# Patient Record
Sex: MALE | Race: BLACK OR AFRICAN AMERICAN | NOT HISPANIC OR LATINO | Employment: FULL TIME | URBAN - METROPOLITAN AREA
[De-identification: names, ages, dates, MRNs, and addresses within clinical notes are randomized per-mention and may not be internally consistent; named-entity substitution may affect disease eponyms.]

---

## 2023-05-04 ENCOUNTER — LAB (OUTPATIENT)
Dept: LAB | Facility: LAB | Age: 53
End: 2023-05-04
Payer: COMMERCIAL

## 2023-05-04 ENCOUNTER — OFFICE VISIT (OUTPATIENT)
Dept: PRIMARY CARE | Facility: CLINIC | Age: 53
End: 2023-05-04
Payer: COMMERCIAL

## 2023-05-04 VITALS
BODY MASS INDEX: 33.8 KG/M2 | OXYGEN SATURATION: 98 % | WEIGHT: 223 LBS | HEIGHT: 68 IN | DIASTOLIC BLOOD PRESSURE: 111 MMHG | SYSTOLIC BLOOD PRESSURE: 163 MMHG | HEART RATE: 100 BPM | TEMPERATURE: 97.8 F

## 2023-05-04 DIAGNOSIS — E11.9 TYPE 2 DIABETES MELLITUS WITHOUT COMPLICATION, WITHOUT LONG-TERM CURRENT USE OF INSULIN (MULTI): ICD-10-CM

## 2023-05-04 DIAGNOSIS — M1A.0790 CHRONIC IDIOPATHIC GOUT INVOLVING TOE WITHOUT TOPHUS, UNSPECIFIED LATERALITY: ICD-10-CM

## 2023-05-04 DIAGNOSIS — R29.898 LEG WEAKNESS, BILATERAL: ICD-10-CM

## 2023-05-04 DIAGNOSIS — I10 PRIMARY HYPERTENSION: ICD-10-CM

## 2023-05-04 DIAGNOSIS — E11.9 TYPE 2 DIABETES MELLITUS WITHOUT COMPLICATION, WITHOUT LONG-TERM CURRENT USE OF INSULIN (MULTI): Primary | ICD-10-CM

## 2023-05-04 DIAGNOSIS — Z00.00 HEALTHCARE MAINTENANCE: ICD-10-CM

## 2023-05-04 LAB
ALANINE AMINOTRANSFERASE (SGPT) (U/L) IN SER/PLAS: 14 U/L (ref 10–52)
ALBUMIN (G/DL) IN SER/PLAS: 4.3 G/DL (ref 3.4–5)
ALKALINE PHOSPHATASE (U/L) IN SER/PLAS: 60 U/L (ref 33–120)
ANION GAP IN SER/PLAS: 14 MMOL/L (ref 10–20)
ASPARTATE AMINOTRANSFERASE (SGOT) (U/L) IN SER/PLAS: 15 U/L (ref 9–39)
BASOPHILS (10*3/UL) IN BLOOD BY AUTOMATED COUNT: 0.07 X10E9/L (ref 0–0.1)
BASOPHILS/100 LEUKOCYTES IN BLOOD BY AUTOMATED COUNT: 1 % (ref 0–2)
BILIRUBIN TOTAL (MG/DL) IN SER/PLAS: 0.5 MG/DL (ref 0–1.2)
CALCIUM (MG/DL) IN SER/PLAS: 9.9 MG/DL (ref 8.6–10.6)
CARBON DIOXIDE, TOTAL (MMOL/L) IN SER/PLAS: 26 MMOL/L (ref 21–32)
CHLORIDE (MMOL/L) IN SER/PLAS: 101 MMOL/L (ref 98–107)
CHOLESTEROL (MG/DL) IN SER/PLAS: 199 MG/DL (ref 0–199)
CHOLESTEROL IN HDL (MG/DL) IN SER/PLAS: 39.9 MG/DL
CHOLESTEROL/HDL RATIO: 5
CREATININE (MG/DL) IN SER/PLAS: 1.26 MG/DL (ref 0.5–1.3)
EOSINOPHILS (10*3/UL) IN BLOOD BY AUTOMATED COUNT: 0.19 X10E9/L (ref 0–0.7)
EOSINOPHILS/100 LEUKOCYTES IN BLOOD BY AUTOMATED COUNT: 2.8 % (ref 0–6)
ERYTHROCYTE DISTRIBUTION WIDTH (RATIO) BY AUTOMATED COUNT: 12.4 % (ref 11.5–14.5)
ERYTHROCYTE MEAN CORPUSCULAR HEMOGLOBIN CONCENTRATION (G/DL) BY AUTOMATED: 33.9 G/DL (ref 32–36)
ERYTHROCYTE MEAN CORPUSCULAR VOLUME (FL) BY AUTOMATED COUNT: 85 FL (ref 80–100)
ERYTHROCYTES (10*6/UL) IN BLOOD BY AUTOMATED COUNT: 5.09 X10E12/L (ref 4.5–5.9)
ESTIMATED AVERAGE GLUCOSE FOR HBA1C: 324 MG/DL
GFR MALE: 67 ML/MIN/1.73M2
GLUCOSE (MG/DL) IN SER/PLAS: 294 MG/DL (ref 74–99)
HEMATOCRIT (%) IN BLOOD BY AUTOMATED COUNT: 43.3 % (ref 41–52)
HEMOGLOBIN (G/DL) IN BLOOD: 14.7 G/DL (ref 13.5–17.5)
HEMOGLOBIN A1C/HEMOGLOBIN TOTAL IN BLOOD: 12.9 %
HEPATITIS C VIRUS AB PRESENCE IN SERUM: NONREACTIVE
HIV 1/ 2 AG/AB SCREEN: NONREACTIVE
IMMATURE GRANULOCYTES/100 LEUKOCYTES IN BLOOD BY AUTOMATED COUNT: 0.3 % (ref 0–0.9)
LDL: 121 MG/DL (ref 0–99)
LEUKOCYTES (10*3/UL) IN BLOOD BY AUTOMATED COUNT: 6.7 X10E9/L (ref 4.4–11.3)
LYMPHOCYTES (10*3/UL) IN BLOOD BY AUTOMATED COUNT: 1.37 X10E9/L (ref 1.2–4.8)
LYMPHOCYTES/100 LEUKOCYTES IN BLOOD BY AUTOMATED COUNT: 20.4 % (ref 13–44)
MAGNESIUM (MG/DL) IN SER/PLAS: 2.21 MG/DL (ref 1.6–2.4)
MONOCYTES (10*3/UL) IN BLOOD BY AUTOMATED COUNT: 0.73 X10E9/L (ref 0.1–1)
MONOCYTES/100 LEUKOCYTES IN BLOOD BY AUTOMATED COUNT: 10.9 % (ref 2–10)
NEUTROPHILS (10*3/UL) IN BLOOD BY AUTOMATED COUNT: 4.33 X10E9/L (ref 1.2–7.7)
NEUTROPHILS/100 LEUKOCYTES IN BLOOD BY AUTOMATED COUNT: 64.6 % (ref 40–80)
NRBC (PER 100 WBCS) BY AUTOMATED COUNT: 0 /100 WBC (ref 0–0)
PLATELETS (10*3/UL) IN BLOOD AUTOMATED COUNT: 242 X10E9/L (ref 150–450)
POTASSIUM (MMOL/L) IN SER/PLAS: 5.1 MMOL/L (ref 3.5–5.3)
PROSTATE SPECIFIC ANTIGEN,SCREEN: 0.73 NG/ML (ref 0–4)
PROTEIN TOTAL: 7.4 G/DL (ref 6.4–8.2)
SODIUM (MMOL/L) IN SER/PLAS: 136 MMOL/L (ref 136–145)
TRIGLYCERIDE (MG/DL) IN SER/PLAS: 189 MG/DL (ref 0–149)
URATE (MG/DL) IN SER/PLAS: 6.5 MG/DL (ref 4–7.5)
UREA NITROGEN (MG/DL) IN SER/PLAS: 23 MG/DL (ref 6–23)
VLDL: 38 MG/DL (ref 0–40)

## 2023-05-04 PROCEDURE — 80053 COMPREHEN METABOLIC PANEL: CPT

## 2023-05-04 PROCEDURE — 1036F TOBACCO NON-USER: CPT | Performed by: STUDENT IN AN ORGANIZED HEALTH CARE EDUCATION/TRAINING PROGRAM

## 2023-05-04 PROCEDURE — 84153 ASSAY OF PSA TOTAL: CPT

## 2023-05-04 PROCEDURE — 87389 HIV-1 AG W/HIV-1&-2 AB AG IA: CPT

## 2023-05-04 PROCEDURE — 99205 OFFICE O/P NEW HI 60 MIN: CPT | Performed by: STUDENT IN AN ORGANIZED HEALTH CARE EDUCATION/TRAINING PROGRAM

## 2023-05-04 PROCEDURE — 83036 HEMOGLOBIN GLYCOSYLATED A1C: CPT

## 2023-05-04 PROCEDURE — 83735 ASSAY OF MAGNESIUM: CPT

## 2023-05-04 PROCEDURE — 80061 LIPID PANEL: CPT

## 2023-05-04 PROCEDURE — 3080F DIAST BP >= 90 MM HG: CPT | Performed by: STUDENT IN AN ORGANIZED HEALTH CARE EDUCATION/TRAINING PROGRAM

## 2023-05-04 PROCEDURE — 84550 ASSAY OF BLOOD/URIC ACID: CPT

## 2023-05-04 PROCEDURE — 3077F SYST BP >= 140 MM HG: CPT | Performed by: STUDENT IN AN ORGANIZED HEALTH CARE EDUCATION/TRAINING PROGRAM

## 2023-05-04 PROCEDURE — 86803 HEPATITIS C AB TEST: CPT

## 2023-05-04 PROCEDURE — 85025 COMPLETE CBC W/AUTO DIFF WBC: CPT

## 2023-05-04 PROCEDURE — 36415 COLL VENOUS BLD VENIPUNCTURE: CPT

## 2023-05-04 RX ORDER — ACYCLOVIR 400 MG/1
1 TABLET ORAL 2 TIMES DAILY
COMMUNITY
Start: 2016-06-15

## 2023-05-04 RX ORDER — SULFAMETHOXAZOLE AND TRIMETHOPRIM 800; 160 MG/1; MG/1
1 TABLET ORAL EVERY 12 HOURS
COMMUNITY
Start: 2023-04-26 | End: 2023-10-10 | Stop reason: WASHOUT

## 2023-05-04 RX ORDER — AMLODIPINE BESYLATE 5 MG/1
5 TABLET ORAL DAILY
COMMUNITY
End: 2023-05-11 | Stop reason: SDUPTHER

## 2023-05-04 RX ORDER — CEPHALEXIN 500 MG/1
500 CAPSULE ORAL 4 TIMES DAILY
COMMUNITY
Start: 2023-04-26 | End: 2023-10-10 | Stop reason: WASHOUT

## 2023-05-04 ASSESSMENT — PAIN SCALES - GENERAL: PAINLEVEL: 0-NO PAIN

## 2023-05-04 NOTE — PROGRESS NOTES
"Subjective   Patient ID: Yrn Rock is a 52 y.o. male who presents for Establish Care.      1. HTN, uncontrolled  /105 in ED  163/111 in office today  Given amlo 5 mg, now up to 10 mg  Also hx of DM2, not on medications  Using BP cuff at home, cannot recall the readings    2. Weakness of legs  Feels more proximal  Is only when going up flights of stairs, not while ambulating on flat surface  Works remotely for [employer]  No traumas or falls  No FH of muscular disease, only dementia in mother    3. Gout  Toes and ankles affected previously  Was after being put on diuretics for HTN    4. RHM  Elects PSA screening -  and does not know father's family well  Colonoscopy counseled  HCV, HIV lifetime screen    Ingrown toenail - going to Podiatrist  Still taking antibiotics - bactrim    PMH  Gout - toe and ankle - asking to avoid diuretics    FH  Dementia in mother - passed Jan 2021  Father's family - DM         Review of Systems   All other systems reviewed and are negative.      Objective   BP (!) 163/11 (BP Location: Left arm, Patient Position: Sitting)   Pulse 100   Temp 36.6 °C (97.8 °F) (Temporal)   Ht 1.727 m (5' 8\")   Wt 101 kg (223 lb)   SpO2 98%   BMI 33.91 kg/m²     Physical Exam  Vitals and nursing note reviewed.   Constitutional:       Appearance: Normal appearance.   HENT:      Head: Normocephalic and atraumatic.      Right Ear: Tympanic membrane normal.      Left Ear: Tympanic membrane normal.   Eyes:      Extraocular Movements: Extraocular movements intact.      Pupils: Pupils are equal, round, and reactive to light.   Cardiovascular:      Rate and Rhythm: Normal rate and regular rhythm.      Pulses: Normal pulses.      Heart sounds: Normal heart sounds.   Pulmonary:      Effort: Pulmonary effort is normal.      Breath sounds: Normal breath sounds.   Abdominal:      General: Abdomen is flat.      Palpations: Abdomen is soft.   Musculoskeletal:         General: Normal range of motion. "      Cervical back: Normal range of motion and neck supple.   Skin:     General: Skin is warm and dry.   Neurological:      General: No focal deficit present.      Mental Status: He is alert and oriented to person, place, and time.      Cranial Nerves: No cranial nerve deficit.      Motor: No weakness.      Gait: Gait normal.      Deep Tendon Reflexes: Reflexes normal.   Psychiatric:         Mood and Affect: Mood normal.         Behavior: Behavior normal.         Assessment/Plan   Problem List Items Addressed This Visit    None  Visit Diagnoses       Type 2 diabetes mellitus without complication, without long-term current use of insulin (CMS/[facility])    -  Primary    Relevant Orders    Hemoglobin A1C    Comprehensive Metabolic Panel    Lipid panel    Primary hypertension        Relevant Orders    Comprehensive Metabolic Panel    Lipid panel    Chronic idiopathic gout involving toe without tophus, unspecified laterality        Relevant Orders    Uric Acid    Healthcare maintenance        Relevant Orders    Prostate Spec.Ag,Screen    CBC and Auto Differential    Hepatitis C antibody    HIV 1/2 Antigen/Antibody Screen with Reflex to Confirmation    Colonoscopy    Leg weakness, bilateral        Relevant Orders    Magnesium             [ ] urid acid level for gout hx  [ ] CMP, CBC with diff, Mg for weakness, HTN, DM2  [ ] A1c for DM2, uncontroleld  [ ] HCV, HIV, lipid screenings  [ ] patient elects PSA screening after discussion of risks/benefits  [ ] colonoscopy screening

## 2023-05-05 DIAGNOSIS — E78.5 HYPERLIPIDEMIA, UNSPECIFIED HYPERLIPIDEMIA TYPE: ICD-10-CM

## 2023-05-05 DIAGNOSIS — E11.9 TYPE 2 DIABETES MELLITUS WITHOUT COMPLICATION, WITHOUT LONG-TERM CURRENT USE OF INSULIN (MULTI): Primary | ICD-10-CM

## 2023-05-05 DIAGNOSIS — I10 PRIMARY HYPERTENSION: ICD-10-CM

## 2023-05-05 NOTE — PROGRESS NOTES
A1c 12.9%  ASCVD 17.3-18.8%    Starting Metformin 1000 mg BID  Starting Atorvastatin 80 mg at bedtime    [facility].

## 2023-05-11 RX ORDER — METFORMIN HYDROCHLORIDE 500 MG/1
1000 TABLET ORAL
Qty: 120 TABLET | Refills: 11 | Status: SHIPPED | OUTPATIENT
Start: 2023-05-11 | End: 2023-10-10 | Stop reason: WASHOUT

## 2023-05-11 RX ORDER — AMLODIPINE BESYLATE 5 MG/1
5 TABLET ORAL DAILY
Qty: 90 TABLET | Refills: 3 | Status: SHIPPED | OUTPATIENT
Start: 2023-05-11 | End: 2023-10-10 | Stop reason: SDUPTHER

## 2023-05-11 RX ORDER — ATORVASTATIN CALCIUM 80 MG/1
80 TABLET, FILM COATED ORAL NIGHTLY
Qty: 90 TABLET | Refills: 3 | Status: SHIPPED | OUTPATIENT
Start: 2023-05-11 | End: 2023-10-10 | Stop reason: SDUPTHER

## 2023-09-06 PROBLEM — M79.672 FOOT PAIN, BILATERAL: Status: ACTIVE | Noted: 2023-09-06

## 2023-09-06 PROBLEM — H02.889 MGD (MEIBOMIAN GLAND DISEASE): Status: ACTIVE | Noted: 2023-09-06

## 2023-09-06 PROBLEM — E78.5 HYPERLIPIDEMIA: Status: ACTIVE | Noted: 2023-09-06

## 2023-09-06 PROBLEM — B00.9 HERPES: Status: ACTIVE | Noted: 2023-09-06

## 2023-09-06 PROBLEM — H25.813 COMBINED FORMS OF AGE-RELATED CATARACT OF BOTH EYES: Status: ACTIVE | Noted: 2023-09-06

## 2023-09-06 PROBLEM — E11.9 DIABETES MELLITUS TYPE 2 WITHOUT RETINOPATHY (MULTI): Status: ACTIVE | Noted: 2023-09-06

## 2023-09-06 PROBLEM — H52.4 MYOPIA WITH ASTIGMATISM AND PRESBYOPIA: Status: ACTIVE | Noted: 2023-09-06

## 2023-09-06 PROBLEM — E11.21 DIABETIC NEPHROPATHY (MULTI): Status: ACTIVE | Noted: 2023-09-06

## 2023-09-06 PROBLEM — E66.9 OBESITY: Status: ACTIVE | Noted: 2023-09-06

## 2023-09-06 PROBLEM — E11.3293 MILD NONPROLIFERATIVE DIABETIC RETINOPATHY OF BOTH EYES ASSOCIATED WITH TYPE 2 DIABETES MELLITUS (MULTI): Status: ACTIVE | Noted: 2023-09-06

## 2023-09-06 PROBLEM — R00.0 TACHYCARDIA: Status: ACTIVE | Noted: 2023-09-06

## 2023-09-06 PROBLEM — H52.209 MYOPIA WITH ASTIGMATISM AND PRESBYOPIA: Status: ACTIVE | Noted: 2023-09-06

## 2023-09-06 PROBLEM — H52.10 MYOPIA WITH ASTIGMATISM AND PRESBYOPIA: Status: ACTIVE | Noted: 2023-09-06

## 2023-09-06 PROBLEM — M79.671 FOOT PAIN, BILATERAL: Status: ACTIVE | Noted: 2023-09-06

## 2023-09-06 PROBLEM — L60.0 ONYCHOCRYPTOSIS: Status: ACTIVE | Noted: 2023-09-06

## 2023-09-06 PROBLEM — K42.9 UMBILICAL HERNIA: Status: ACTIVE | Noted: 2023-09-06

## 2023-09-06 PROBLEM — E55.9 VITAMIN D DEFICIENCY: Status: ACTIVE | Noted: 2023-09-06

## 2023-09-06 PROBLEM — I10 HYPERTENSION: Status: ACTIVE | Noted: 2023-09-06

## 2023-09-06 RX ORDER — ATORVASTATIN CALCIUM 40 MG/1
40 TABLET, FILM COATED ORAL DAILY
COMMUNITY
End: 2023-10-10 | Stop reason: WASHOUT

## 2023-09-06 RX ORDER — LOSARTAN POTASSIUM 100 MG/1
100 TABLET ORAL DAILY
COMMUNITY
End: 2023-10-10 | Stop reason: WASHOUT

## 2023-09-06 RX ORDER — METFORMIN HYDROCHLORIDE 1000 MG/1
1000 TABLET ORAL 2 TIMES DAILY
COMMUNITY
End: 2023-10-10 | Stop reason: WASHOUT

## 2023-09-06 RX ORDER — GLIPIZIDE 5 MG/1
5 TABLET ORAL 2 TIMES DAILY
COMMUNITY
End: 2023-10-10 | Stop reason: WASHOUT

## 2023-09-06 RX ORDER — ASPIRIN 81 MG/1
81 TABLET ORAL DAILY
COMMUNITY
End: 2023-10-10 | Stop reason: WASHOUT

## 2023-10-10 ENCOUNTER — OFFICE VISIT (OUTPATIENT)
Dept: PRIMARY CARE | Facility: HOSPITAL | Age: 53
End: 2023-10-10
Payer: COMMERCIAL

## 2023-10-10 VITALS
HEART RATE: 120 BPM | BODY MASS INDEX: 34.39 KG/M2 | TEMPERATURE: 97 F | WEIGHT: 226.9 LBS | SYSTOLIC BLOOD PRESSURE: 148 MMHG | DIASTOLIC BLOOD PRESSURE: 101 MMHG | OXYGEN SATURATION: 98 % | HEIGHT: 68 IN

## 2023-10-10 DIAGNOSIS — R00.0 TACHYCARDIA: ICD-10-CM

## 2023-10-10 DIAGNOSIS — E11.42 TYPE 2 DIABETES MELLITUS WITH DIABETIC POLYNEUROPATHY, WITHOUT LONG-TERM CURRENT USE OF INSULIN (MULTI): Primary | ICD-10-CM

## 2023-10-10 DIAGNOSIS — E78.5 HYPERLIPIDEMIA, UNSPECIFIED HYPERLIPIDEMIA TYPE: ICD-10-CM

## 2023-10-10 DIAGNOSIS — I10 PRIMARY HYPERTENSION: ICD-10-CM

## 2023-10-10 LAB
ALBUMIN SERPL BCP-MCNC: 4.1 G/DL (ref 3.4–5)
ALP SERPL-CCNC: 57 U/L (ref 33–120)
ALT SERPL W P-5'-P-CCNC: 18 U/L (ref 10–52)
ANION GAP SERPL CALC-SCNC: 17 MMOL/L (ref 10–20)
AST SERPL W P-5'-P-CCNC: 20 U/L (ref 9–39)
BILIRUB SERPL-MCNC: 0.8 MG/DL (ref 0–1.2)
BUN SERPL-MCNC: 19 MG/DL (ref 6–23)
CALCIUM SERPL-MCNC: 9.5 MG/DL (ref 8.6–10.6)
CHLORIDE SERPL-SCNC: 102 MMOL/L (ref 98–107)
CHOLEST SERPL-MCNC: 101 MG/DL (ref 0–199)
CHOLESTEROL/HDL RATIO: 2.7
CO2 SERPL-SCNC: 24 MMOL/L (ref 21–32)
CREAT SERPL-MCNC: 1.02 MG/DL (ref 0.5–1.3)
GFR SERPL CREATININE-BSD FRML MDRD: 87 ML/MIN/1.73M*2
GLUCOSE BLD MANUAL STRIP-MCNC: 162 MG/DL (ref 74–99)
GLUCOSE SERPL-MCNC: 142 MG/DL (ref 74–99)
HDLC SERPL-MCNC: 37.4 MG/DL
LDLC SERPL CALC-MCNC: 47 MG/DL (ref 140–190)
NON HDL CHOLESTEROL: 64 MG/DL (ref 0–149)
POTASSIUM SERPL-SCNC: 4.2 MMOL/L (ref 3.5–5.3)
PROT SERPL-MCNC: 7 G/DL (ref 6.4–8.2)
SODIUM SERPL-SCNC: 139 MMOL/L (ref 136–145)
TRIGL SERPL-MCNC: 85 MG/DL (ref 0–149)
TSH SERPL-ACNC: 1.89 MIU/L (ref 0.44–3.98)
VLDL: 17 MG/DL (ref 0–40)

## 2023-10-10 PROCEDURE — 36415 COLL VENOUS BLD VENIPUNCTURE: CPT

## 2023-10-10 PROCEDURE — 93010 ELECTROCARDIOGRAM REPORT: CPT

## 2023-10-10 PROCEDURE — 80053 COMPREHEN METABOLIC PANEL: CPT

## 2023-10-10 PROCEDURE — 82043 UR ALBUMIN QUANTITATIVE: CPT

## 2023-10-10 PROCEDURE — 90686 IIV4 VACC NO PRSV 0.5 ML IM: CPT | Mod: GC

## 2023-10-10 PROCEDURE — 3046F HEMOGLOBIN A1C LEVEL >9.0%: CPT

## 2023-10-10 PROCEDURE — 90471 IMMUNIZATION ADMIN: CPT | Mod: GC

## 2023-10-10 PROCEDURE — 93010 ELECTROCARDIOGRAM REPORT: CPT | Performed by: INTERNAL MEDICINE

## 2023-10-10 PROCEDURE — 84443 ASSAY THYROID STIM HORMONE: CPT

## 2023-10-10 PROCEDURE — 3048F LDL-C <100 MG/DL: CPT

## 2023-10-10 PROCEDURE — 3077F SYST BP >= 140 MM HG: CPT

## 2023-10-10 PROCEDURE — 3062F POS MACROALBUMINURIA REV: CPT

## 2023-10-10 PROCEDURE — 3080F DIAST BP >= 90 MM HG: CPT

## 2023-10-10 PROCEDURE — 36416 COLLJ CAPILLARY BLOOD SPEC: CPT

## 2023-10-10 PROCEDURE — 99213 OFFICE O/P EST LOW 20 MIN: CPT | Mod: GC,25

## 2023-10-10 PROCEDURE — 1036F TOBACCO NON-USER: CPT

## 2023-10-10 PROCEDURE — 93005 ELECTROCARDIOGRAM TRACING: CPT

## 2023-10-10 PROCEDURE — 82947 ASSAY GLUCOSE BLOOD QUANT: CPT

## 2023-10-10 PROCEDURE — 99213 OFFICE O/P EST LOW 20 MIN: CPT

## 2023-10-10 PROCEDURE — 80061 LIPID PANEL: CPT

## 2023-10-10 PROCEDURE — 36416 COLLJ CAPILLARY BLOOD SPEC: CPT | Mod: GC

## 2023-10-10 PROCEDURE — 83036 HEMOGLOBIN GLYCOSYLATED A1C: CPT

## 2023-10-10 RX ORDER — METFORMIN HYDROCHLORIDE 500 MG/1
1000 TABLET, EXTENDED RELEASE ORAL
Qty: 60 TABLET | Refills: 5 | Status: SHIPPED | OUTPATIENT
Start: 2023-10-10 | End: 2024-02-22 | Stop reason: SDUPTHER

## 2023-10-10 RX ORDER — ATORVASTATIN CALCIUM 80 MG/1
80 TABLET, FILM COATED ORAL NIGHTLY
Qty: 90 TABLET | Refills: 1 | Status: SHIPPED | OUTPATIENT
Start: 2023-10-10 | End: 2024-04-18

## 2023-10-10 RX ORDER — AMLODIPINE BESYLATE 5 MG/1
10 TABLET ORAL DAILY
Qty: 90 TABLET | Refills: 3 | Status: SHIPPED | OUTPATIENT
Start: 2023-10-10 | End: 2024-01-12 | Stop reason: SINTOL

## 2023-10-10 ASSESSMENT — COLUMBIA-SUICIDE SEVERITY RATING SCALE - C-SSRS
1. IN THE PAST MONTH, HAVE YOU WISHED YOU WERE DEAD OR WISHED YOU COULD GO TO SLEEP AND NOT WAKE UP?: NO
2. HAVE YOU ACTUALLY HAD ANY THOUGHTS OF KILLING YOURSELF?: NO
6. HAVE YOU EVER DONE ANYTHING, STARTED TO DO ANYTHING, OR PREPARED TO DO ANYTHING TO END YOUR LIFE?: NO

## 2023-10-10 ASSESSMENT — PATIENT HEALTH QUESTIONNAIRE - PHQ9
1. LITTLE INTEREST OR PLEASURE IN DOING THINGS: NOT AT ALL
2. FEELING DOWN, DEPRESSED OR HOPELESS: NOT AT ALL
SUM OF ALL RESPONSES TO PHQ9 QUESTIONS 1 AND 2: 0

## 2023-10-10 ASSESSMENT — LIFESTYLE VARIABLES
HOW OFTEN DO YOU HAVE SIX OR MORE DRINKS ON ONE OCCASION: NEVER
HOW OFTEN DO YOU HAVE A DRINK CONTAINING ALCOHOL: NEVER
AUDIT-C TOTAL SCORE: 0
SKIP TO QUESTIONS 9-10: 1
HOW MANY STANDARD DRINKS CONTAINING ALCOHOL DO YOU HAVE ON A TYPICAL DAY: PATIENT DOES NOT DRINK

## 2023-10-10 ASSESSMENT — PAIN SCALES - GENERAL: PAINLEVEL: 0-NO PAIN

## 2023-10-10 ASSESSMENT — ENCOUNTER SYMPTOMS
LOSS OF SENSATION IN FEET: 0
OCCASIONAL FEELINGS OF UNSTEADINESS: 0
DEPRESSION: 0

## 2023-10-10 NOTE — PATIENT INSTRUCTIONS
Hi Mr Rock,     Thank you for visiting us today. It was a pleasure seeing you. Here is a summary of what we discussed:     Diabetes - we will try changing your Metformin to extended release and start you on a new medication (Jardiance)  2.   High Blood pressure - increasing amlodipine to 10mg per day  3.   Flu shot  4.   Blood work - we will call with results or you can see the results through Patient Portal    Please return to clinic 3 months  I will next be on clinic in middle December.     Best,  Troy Medeiros MD  [facility]

## 2023-10-10 NOTE — PROGRESS NOTES
Yrn Rock is a 53 y.o. male with HTN, HLD, Type 2 DM presenting with for a follow up, last seen in May 2023. Pt has been having diarrhea since restarting Metformin in May.     Subjective   Pt has been having diarrhea since restarting Metformin in May. He denies constipation, blood in the stool. He also endorses mild weakness in his legs and knees. He works from home and feels like he needs to workout more. He has occasional muscle aches, but the weakness and aches are not effecting his day to day life. Other than this he says he is doing well. His HR was elevated today but he denies chest pain or noticing palpations at home.       Past Medical History:   Diagnosis Date    Dermatochalasis of unspecified eye, unspecified eyelid 08/20/2015    Dermatochalasis    Dermatochalasis of unspecified eye, unspecified eyelid 08/21/2015    Dermatochalasis    Essential (primary) hypertension 12/17/2018    Hypertension    Hyperlipidemia, unspecified 12/17/2018    Hyperlipidemia    Other specified health status     No pertinent past surgical history    Type 2 diabetes mellitus without complications (CMS/[facility]) 12/11/2018    Diabetes mellitus        Review of Systems   All other systems reviewed and are negative.       Current Outpatient Medications on File Prior to Visit   Medication Sig Dispense Refill    acyclovir (Zovirax) 400 mg tablet Take 1 tablet (400 mg) by mouth 2 times a day.      amLODIPine (Norvasc) 5 mg tablet Take 1 tablet (5 mg) by mouth once daily. 90 tablet 3    aspirin 81 mg EC tablet Take 1 tablet (81 mg) by mouth once daily.      atorvastatin (Lipitor) 40 mg tablet Take 1 tablet (40 mg) by mouth once daily.      atorvastatin (Lipitor) 80 mg tablet Take 1 tablet (80 mg) by mouth once daily at bedtime. 90 tablet 3    cephalexin (Keflex) 500 mg capsule Take 1 capsule (500 mg) by mouth 4 times a day.      cholecalciferol, vitamin D3, (VITAMIN D3 ORAL)       glipiZIDE (Glucotrol) 5 mg tablet Take 1 tablet (5 mg) by  mouth 2 times a day.      losartan (Cozaar) 100 mg tablet Take 1 tablet (100 mg) by mouth once daily.      metFORMIN (Glucophage) 1,000 mg tablet Take 1 tablet (1,000 mg) by mouth 2 times a day.      metFORMIN (Glucophage) 500 mg tablet Take 2 tablets (1,000 mg) by mouth 2 times a day with meals. 120 tablet 11    sulfamethoxazole-trimethoprim (Bactrim DS) 800-160 mg tablet Take 1 tablet by mouth every 12 hours.       No current facility-administered medications on file prior to visit.        Objective     Last Recorded Vitals  There were no vitals taken for this visit.    Physical Exam  Constitutional:       General: He is not in acute distress.     Appearance: Normal appearance.   Eyes:      General: No scleral icterus.  Cardiovascular:      Rate and Rhythm: Normal rate and regular rhythm.   Pulmonary:      Effort: No respiratory distress.      Breath sounds: Normal breath sounds. No wheezing or rhonchi.   Abdominal:      General: There is no distension.      Palpations: Abdomen is soft.      Tenderness: There is no abdominal tenderness. There is no guarding.   Musculoskeletal:         General: Swelling (trace LE edema) present. No tenderness.   Skin:     Findings: No bruising or rash.   Neurological:      Mental Status: He is alert.      Cranial Nerves: No cranial nerve deficit.      Motor: No weakness.      Admission Weight       Daily Weight  05/31/23 : 101 kg (223 lb)    Image Results  XR foot  Narrative: Interpreted By:  DARIO TRIANA MD and DARIO VALLADARES MD  MRN: [id number]  Patient Name: HECTOR GROVES     STUDY:  FOOT; COMPLETE, MIN 3 VIEWS; Right;  4/26/2023 2:07 am     INDICATION:  c/f infection of his great toe .     COMPARISON:  None.     ACCESSION NUMBER(S):  [id number]     ORDERING CLINICIAN:  KOTA COLEY     FINDINGS:  3 radiographs of the right foot were obtained.     No acute fracture or dislocation. There are no erosive changes or  periosteal reaction. No radiopaque foreign body or soft tissue  gas.  Note is made of enthesophyte at the insertion of the Achilles tendon.  There is a well corticated ossific fragment at the level of the  medial malleolus, favored to represent a remote avulsion fracture.     Impression: No acute fracture or dislocation of the right foot. No erosive  changes or periosteal reaction. If there is persistent clinical  concern for osteomyelitis, MRI is more sensitive.     I personally reviewed the images/study and I agree with the findings  as stated by resident physician Dr. Jacinto Mcghee.  Electrocardiogram 12 Lead  Please see ED Provider Note for formal interpretation  Confirmed by Bruno Denton ([id number]) on 4/26/2023 1:37:46 AM      Relevant Results    Lab Results   Component Value Date    WBC 6.7 05/04/2023    HGB 14.7 05/04/2023    HCT 43.3 05/04/2023    MCV 85 05/04/2023     05/04/2023          Chemistry    Lab Results   Component Value Date/Time     05/04/2023 0906    K 5.1 05/04/2023 0906     05/04/2023 0906    CO2 26 05/04/2023 0906    BUN 23 05/04/2023 0906    CREATININE 1.26 05/04/2023 0906    Lab Results   Component Value Date/Time    CALCIUM 9.9 05/04/2023 0906    ALKPHOS 60 05/04/2023 0906    AST 15 05/04/2023 0906    ALT 14 05/04/2023 0906    BILITOT 0.5 05/04/2023 0906                         Assessment/Plan   49 YO F with HTN, HLD, and DMII, last seen by family medicine 5/2023.      #HTN   - Increased Amlodipine to 10mg daily  - has home BP cuff: encouraged home cuffs  - EKG: LVH    #HLD  - lipid panel 5/2023: HDL 39.9, LDL: 121, Chol: 199   - ASCVD: 31.4%  -atorvastatin 80mg nightly  -aspirin 81 mg for primary prevention  -fasting lipid panel ordered    #Tachycardia   -denies CP, HA, vision changes   -EKG: sinus tachycardia and LVH  -TSH ordered    #DMII  -HbA1c 12.9 in 5/2023  -on metformin 1000 bid and glipizide 10 mg daily  - last eye appointment: May 2023 -mild nonproliferative diabetic retinopathy   -last podiatry appointment August 2023  for diabetic foot exam. Had onychocryptosis and thick yellow nails with c/f early fungal infection. Plan was to f/up in 2 months  #1 onychocryptosis  Slant back debridement of hallux borders  Patient may need surgical nail procedure if pain remains  F/U 2 months    #2 DM with neuropathy  Discussion with patient on the need for glucose control to control his neuropathic symptoms  Continue with daily foot checks.    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

## 2023-10-10 NOTE — H&P (VIEW-ONLY)
Yrn Rock is a 53 y.o. male with HTN, HLD, Type 2 DM presenting with for a follow up, last seen in May 2023. Pt has been having diarrhea since restarting Metformin in May.     Subjective   Pt has been having diarrhea since restarting Metformin in May. He denies constipation, blood in the stool. He also endorses mild weakness in his legs and knees. He works from home and feels like he needs to workout more. He has occasional muscle aches, but the weakness and aches are not effecting his day to day life. Other than this he says he is doing well. His HR was elevated today but he denies chest pain or noticing palpations at home.       Past Medical History:   Diagnosis Date    Dermatochalasis of unspecified eye, unspecified eyelid 08/20/2015    Dermatochalasis    Dermatochalasis of unspecified eye, unspecified eyelid 08/21/2015    Dermatochalasis    Essential (primary) hypertension 12/17/2018    Hypertension    Hyperlipidemia, unspecified 12/17/2018    Hyperlipidemia    Other specified health status     No pertinent past surgical history    Type 2 diabetes mellitus without complications (CMS/[facility]) 12/11/2018    Diabetes mellitus        Review of Systems   All other systems reviewed and are negative.       Current Outpatient Medications on File Prior to Visit   Medication Sig Dispense Refill    acyclovir (Zovirax) 400 mg tablet Take 1 tablet (400 mg) by mouth 2 times a day.      amLODIPine (Norvasc) 5 mg tablet Take 1 tablet (5 mg) by mouth once daily. 90 tablet 3    aspirin 81 mg EC tablet Take 1 tablet (81 mg) by mouth once daily.      atorvastatin (Lipitor) 40 mg tablet Take 1 tablet (40 mg) by mouth once daily.      atorvastatin (Lipitor) 80 mg tablet Take 1 tablet (80 mg) by mouth once daily at bedtime. 90 tablet 3    cephalexin (Keflex) 500 mg capsule Take 1 capsule (500 mg) by mouth 4 times a day.      cholecalciferol, vitamin D3, (VITAMIN D3 ORAL)       glipiZIDE (Glucotrol) 5 mg tablet Take 1 tablet (5 mg) by  mouth 2 times a day.      losartan (Cozaar) 100 mg tablet Take 1 tablet (100 mg) by mouth once daily.      metFORMIN (Glucophage) 1,000 mg tablet Take 1 tablet (1,000 mg) by mouth 2 times a day.      metFORMIN (Glucophage) 500 mg tablet Take 2 tablets (1,000 mg) by mouth 2 times a day with meals. 120 tablet 11    sulfamethoxazole-trimethoprim (Bactrim DS) 800-160 mg tablet Take 1 tablet by mouth every 12 hours.       No current facility-administered medications on file prior to visit.        Objective     Last Recorded Vitals  There were no vitals taken for this visit.    Physical Exam  Constitutional:       General: He is not in acute distress.     Appearance: Normal appearance.   Eyes:      General: No scleral icterus.  Cardiovascular:      Rate and Rhythm: Normal rate and regular rhythm.   Pulmonary:      Effort: No respiratory distress.      Breath sounds: Normal breath sounds. No wheezing or rhonchi.   Abdominal:      General: There is no distension.      Palpations: Abdomen is soft.      Tenderness: There is no abdominal tenderness. There is no guarding.   Musculoskeletal:         General: Swelling (trace LE edema) present. No tenderness.   Skin:     Findings: No bruising or rash.   Neurological:      Mental Status: He is alert.      Cranial Nerves: No cranial nerve deficit.      Motor: No weakness.      Admission Weight       Daily Weight  05/31/23 : 101 kg (223 lb)    Image Results  XR foot  Narrative: Interpreted By:  DARIO TRIANA MD and DARIO VALLADARES MD  MRN: [id number]  Patient Name: HECTOR GROVES     STUDY:  FOOT; COMPLETE, MIN 3 VIEWS; Right;  4/26/2023 2:07 am     INDICATION:  c/f infection of his great toe .     COMPARISON:  None.     ACCESSION NUMBER(S):  [id number]     ORDERING CLINICIAN:  KOTA COLEY     FINDINGS:  3 radiographs of the right foot were obtained.     No acute fracture or dislocation. There are no erosive changes or  periosteal reaction. No radiopaque foreign body or soft tissue  gas.  Note is made of enthesophyte at the insertion of the Achilles tendon.  There is a well corticated ossific fragment at the level of the  medial malleolus, favored to represent a remote avulsion fracture.     Impression: No acute fracture or dislocation of the right foot. No erosive  changes or periosteal reaction. If there is persistent clinical  concern for osteomyelitis, MRI is more sensitive.     I personally reviewed the images/study and I agree with the findings  as stated by resident physician Dr. Jacinto Mcghee.  Electrocardiogram 12 Lead  Please see ED Provider Note for formal interpretation  Confirmed by Bruno Denton ([id number]) on 4/26/2023 1:37:46 AM      Relevant Results    Lab Results   Component Value Date    WBC 6.7 05/04/2023    HGB 14.7 05/04/2023    HCT 43.3 05/04/2023    MCV 85 05/04/2023     05/04/2023          Chemistry    Lab Results   Component Value Date/Time     05/04/2023 0906    K 5.1 05/04/2023 0906     05/04/2023 0906    CO2 26 05/04/2023 0906    BUN 23 05/04/2023 0906    CREATININE 1.26 05/04/2023 0906    Lab Results   Component Value Date/Time    CALCIUM 9.9 05/04/2023 0906    ALKPHOS 60 05/04/2023 0906    AST 15 05/04/2023 0906    ALT 14 05/04/2023 0906    BILITOT 0.5 05/04/2023 0906                         Assessment/Plan   47 YO F with HTN, HLD, and DMII, last seen by family medicine 5/2023.      #HTN   - Increased Amlodipine to 10mg daily  - has home BP cuff: encouraged home cuffs  - EKG: LVH    #HLD  - lipid panel 5/2023: HDL 39.9, LDL: 121, Chol: 199   - ASCVD: 31.4%  -atorvastatin 80mg nightly  -aspirin 81 mg for primary prevention  -fasting lipid panel ordered    #Tachycardia   -denies CP, HA, vision changes   -EKG: sinus tachycardia and LVH  -TSH ordered    #DMII  -HbA1c 12.9 in 5/2023  -on metformin 1000 bid and glipizide 10 mg daily  - last eye appointment: May 2023 -mild nonproliferative diabetic retinopathy   -last podiatry appointment August 2023  for diabetic foot exam. Had onychocryptosis and thick yellow nails with c/f early fungal infection. Plan was to f/up in 2 months  #1 onychocryptosis  Slant back debridement of hallux borders  Patient may need surgical nail procedure if pain remains  F/U 2 months    #2 DM with neuropathy  Discussion with patient on the need for glucose control to control his neuropathic symptoms  Continue with daily foot checks.    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

## 2023-10-11 ENCOUNTER — TELEPHONE (OUTPATIENT)
Dept: PRIMARY CARE | Facility: HOSPITAL | Age: 53
End: 2023-10-11

## 2023-10-11 ENCOUNTER — OFFICE VISIT (OUTPATIENT)
Dept: PODIATRY | Facility: CLINIC | Age: 53
End: 2023-10-11
Payer: COMMERCIAL

## 2023-10-11 DIAGNOSIS — B35.1 ONYCHOMYCOSIS: ICD-10-CM

## 2023-10-11 DIAGNOSIS — E11.49 TYPE II DIABETES MELLITUS WITH NEUROLOGICAL MANIFESTATIONS (MULTI): ICD-10-CM

## 2023-10-11 DIAGNOSIS — L60.0 INGROWN TOENAIL: Primary | ICD-10-CM

## 2023-10-11 DIAGNOSIS — M79.671 PAIN IN BOTH FEET: ICD-10-CM

## 2023-10-11 DIAGNOSIS — M79.672 PAIN IN BOTH FEET: ICD-10-CM

## 2023-10-11 LAB
EST. AVERAGE GLUCOSE BLD GHB EST-MCNC: 240 MG/DL
HBA1C MFR BLD: 10 %

## 2023-10-11 PROCEDURE — 99213 OFFICE O/P EST LOW 20 MIN: CPT | Performed by: PODIATRIST

## 2023-10-11 PROCEDURE — 1036F TOBACCO NON-USER: CPT | Performed by: PODIATRIST

## 2023-10-11 PROCEDURE — 3048F LDL-C <100 MG/DL: CPT | Performed by: PODIATRIST

## 2023-10-11 PROCEDURE — 3046F HEMOGLOBIN A1C LEVEL >9.0%: CPT | Performed by: PODIATRIST

## 2023-10-11 NOTE — PATIENT INSTRUCTIONS
#1 onychocryptosis  Slant back debridement of hallux borders  Patient may need surgical nail procedure if pain remains  F/U 2-3 months     #2 DM with neuropathy  Control glucose.  Avoid barefoot walking  Continue with daily foot checks.     #3 Thick yellow nails bilaterally  May be early fungal infection  Can take nail biopsy as needed  Debrided all nails in length at this time  Follow-up 2-3 months

## 2023-10-11 NOTE — PROGRESS NOTES
I saw and evaluated the patient. I personally obtained the key and critical portions of the history and physical exam or was physically present for key and critical portions performed by the resident/fellow. I reviewed the resident/fellow's documentation and discussed the patient with the resident/fellow. I agree with the resident/fellow's medical decision making as documented in the note.    Erica Mauro MD MPH

## 2023-10-11 NOTE — TELEPHONE ENCOUNTER
Spoke with patient on phone about lab results. A1c is elevated at 10.0 (improved since May 2023) and encouraged pt to continue with Jardiance and RTC in  for a recheck of A1c    Troy Medeiros MD

## 2023-10-11 NOTE — PROGRESS NOTES
Chief Complaint   Patient presents with    nail care     LUCIUS      Patient complains of painful elongated toenails, requests debridement.  Patient denies any other pedal complaints at this time.  Pain resolved with nail debridements after last visit and then returns once nails grow longer.  Neuropathy remains.  Glucose remains elevated.      Patient alert, oriented, no acute distress     +2/4 pedal pulses B/L.   Feet warm to touch. (-)hair growth B/L.   Mild LE edema B/L and multiple varicosities.   No weeping, no bulla, no ulcers noted B/L.  Neuropathy remains B/L.  Light touch intact B/L.   Onychocryptosis to all nails noted.  No erythema, no edema, no calor, no drainage noted along any nail borders bilaterally.  Pain on palpation of hallux nail borders.  Slight yellow, thick distal nails B/L, most involved is the hallux nails, No subungual debris noted.  Gross motor is intact B/L.  Webspaces are dry, clean, and intact bilaterally  Light touch is intact bilaterally  No ulcerations are noted bilaterally.  Mild skin xerosis noted on the plantar feet bilaterally, no fissures, no ulcers noted  Lab Results   Component Value Date    HGBA1C 10.0 (H) 10/10/2023      #1 onychocryptosis  Slant back debridement of hallux borders  Patient may need surgical nail procedure if pain remains  F/U 2-3 months     #2 DM with neuropathy  Control glucose.  Avoid barefoot walking  Continue with daily foot checks.     #3 Thick yellow nails bilaterally  May be early fungal infection  Can take nail biopsy as needed  Debrided all nails in length at this time  Follow-up 2-3 months

## 2023-10-12 LAB
CREAT UR-MCNC: 152.5 MG/DL (ref 20–370)
MICROALBUMIN UR-MCNC: >2250 MG/L
MICROALBUMIN/CREAT UR: NORMAL MG/G{CREAT}

## 2023-10-31 ENCOUNTER — HOSPITAL ENCOUNTER (OUTPATIENT)
Dept: GASTROENTEROLOGY | Facility: HOSPITAL | Age: 53
Setting detail: OUTPATIENT SURGERY
Discharge: HOME | End: 2023-10-31
Payer: COMMERCIAL

## 2023-10-31 VITALS
HEIGHT: 68 IN | WEIGHT: 220 LBS | DIASTOLIC BLOOD PRESSURE: 88 MMHG | RESPIRATION RATE: 18 BRPM | OXYGEN SATURATION: 96 % | TEMPERATURE: 98.1 F | HEART RATE: 92 BPM | BODY MASS INDEX: 33.34 KG/M2 | SYSTOLIC BLOOD PRESSURE: 131 MMHG

## 2023-10-31 DIAGNOSIS — Z00.00 HEALTHCARE MAINTENANCE: ICD-10-CM

## 2023-10-31 LAB — GLUCOSE BLD MANUAL STRIP-MCNC: 189 MG/DL (ref 74–99)

## 2023-10-31 PROCEDURE — 45378 DIAGNOSTIC COLONOSCOPY: CPT | Performed by: INTERNAL MEDICINE

## 2023-10-31 PROCEDURE — 7100000010 HC PHASE TWO TIME - EACH INCREMENTAL 1 MINUTE: Performed by: INTERNAL MEDICINE

## 2023-10-31 PROCEDURE — 2500000004 HC RX 250 GENERAL PHARMACY W/ HCPCS (ALT 636 FOR OP/ED): Performed by: INTERNAL MEDICINE

## 2023-10-31 PROCEDURE — G0121 COLON CA SCRN NOT HI RSK IND: HCPCS | Performed by: INTERNAL MEDICINE

## 2023-10-31 PROCEDURE — 3700000013 HC SEDATION LEVEL 5+ TIME - EACH ADDITIONAL 15 MINUTES: Performed by: INTERNAL MEDICINE

## 2023-10-31 PROCEDURE — 99153 MOD SED SAME PHYS/QHP EA: CPT | Performed by: INTERNAL MEDICINE

## 2023-10-31 PROCEDURE — 99152 MOD SED SAME PHYS/QHP 5/>YRS: CPT | Performed by: INTERNAL MEDICINE

## 2023-10-31 PROCEDURE — 82947 ASSAY GLUCOSE BLOOD QUANT: CPT

## 2023-10-31 PROCEDURE — 3700000012 HC SEDATION LEVEL 5+ TIME - INITIAL 15 MINUTES 5/> YEARS: Performed by: INTERNAL MEDICINE

## 2023-10-31 PROCEDURE — 7100000009 HC PHASE TWO TIME - INITIAL BASE CHARGE: Performed by: INTERNAL MEDICINE

## 2023-10-31 RX ORDER — MIDAZOLAM HYDROCHLORIDE 1 MG/ML
INJECTION, SOLUTION INTRAMUSCULAR; INTRAVENOUS AS NEEDED
Status: COMPLETED | OUTPATIENT
Start: 2023-10-31 | End: 2023-10-31

## 2023-10-31 RX ORDER — MEPERIDINE HYDROCHLORIDE 25 MG/ML
INJECTION INTRAMUSCULAR; INTRAVENOUS; SUBCUTANEOUS AS NEEDED
Status: COMPLETED | OUTPATIENT
Start: 2023-10-31 | End: 2023-10-31

## 2023-10-31 RX ORDER — MIDAZOLAM HYDROCHLORIDE 5 MG/ML
INJECTION, SOLUTION INTRAMUSCULAR; INTRAVENOUS AS NEEDED
Status: COMPLETED | OUTPATIENT
Start: 2023-10-31 | End: 2023-10-31

## 2023-10-31 RX ADMIN — MEPERIDINE HYDROCHLORIDE 50 MG: 25 INJECTION INTRAMUSCULAR; INTRAVENOUS; SUBCUTANEOUS at 12:19

## 2023-10-31 RX ADMIN — MIDAZOLAM HYDROCHLORIDE 2 MG: 5 INJECTION, SOLUTION INTRAMUSCULAR; INTRAVENOUS at 12:19

## 2023-10-31 RX ADMIN — MIDAZOLAM 1 MG: 1 INJECTION INTRAMUSCULAR; INTRAVENOUS at 12:21

## 2023-10-31 RX ADMIN — MEPERIDINE HYDROCHLORIDE 25 MG: 25 INJECTION INTRAMUSCULAR; INTRAVENOUS; SUBCUTANEOUS at 12:21

## 2023-10-31 ASSESSMENT — PAIN SCALES - GENERAL
PAINLEVEL_OUTOF10: 3
PAINLEVEL_OUTOF10: 0 - NO PAIN

## 2023-10-31 ASSESSMENT — PAIN - FUNCTIONAL ASSESSMENT
PAIN_FUNCTIONAL_ASSESSMENT: 0-10
PAIN_FUNCTIONAL_ASSESSMENT: UNABLE TO SELF-REPORT
PAIN_FUNCTIONAL_ASSESSMENT: 0-10
PAIN_FUNCTIONAL_ASSESSMENT: 0-10
PAIN_FUNCTIONAL_ASSESSMENT: UNABLE TO SELF-REPORT
PAIN_FUNCTIONAL_ASSESSMENT: 0-10
PAIN_FUNCTIONAL_ASSESSMENT: UNABLE TO SELF-REPORT
PAIN_FUNCTIONAL_ASSESSMENT: 0-10
PAIN_FUNCTIONAL_ASSESSMENT: UNABLE TO SELF-REPORT
PAIN_FUNCTIONAL_ASSESSMENT: 0-10
PAIN_FUNCTIONAL_ASSESSMENT: 0-10

## 2023-10-31 ASSESSMENT — COLUMBIA-SUICIDE SEVERITY RATING SCALE - C-SSRS
6. HAVE YOU EVER DONE ANYTHING, STARTED TO DO ANYTHING, OR PREPARED TO DO ANYTHING TO END YOUR LIFE?: NO
2. HAVE YOU ACTUALLY HAD ANY THOUGHTS OF KILLING YOURSELF?: NO
1. IN THE PAST MONTH, HAVE YOU WISHED YOU WERE DEAD OR WISHED YOU COULD GO TO SLEEP AND NOT WAKE UP?: NO

## 2023-10-31 NOTE — PRE-SEDATION DOCUMENTATION
Patient: Yrn Rock  MRN: [id number]    Pre-sedation Evaluation:  Sedation necessary for: Analgesia  Requesting service: GI    History of Present Illness: Patient is a 54 yo M who presents for colon cancer screening.   Plan for colonoscopy.     Past Medical History:   Diagnosis Date    Dermatochalasis of unspecified eye, unspecified eyelid 08/20/2015    Dermatochalasis    Dermatochalasis of unspecified eye, unspecified eyelid 08/21/2015    Dermatochalasis    Essential (primary) hypertension 12/17/2018    Hypertension    Hyperlipidemia, unspecified 12/17/2018    Hyperlipidemia    Other specified health status     No pertinent past surgical history    Type 2 diabetes mellitus without complications (CMS/[facility]) 12/11/2018    Diabetes mellitus       Principle problems:  Patient Active Problem List    Diagnosis Date Noted    Diabetes mellitus type 2 without retinopathy (CMS/[facility]) 09/06/2023    Hyperlipidemia 09/06/2023    Hypertension 09/06/2023    MGD (meibomian gland disease) 09/06/2023    Myopia with astigmatism and presbyopia 09/06/2023    Obesity 09/06/2023    Tachycardia 09/06/2023    Umbilical hernia 09/06/2023    Vitamin D deficiency 09/06/2023    Herpes 09/06/2023    Foot pain, bilateral 09/06/2023    Diabetic nephropathy (CMS/[facility]) 09/06/2023    Onychocryptosis 09/06/2023    Combined forms of age-related cataract of both eyes 09/06/2023    Mild nonproliferative diabetic retinopathy of both eyes associated with type 2 diabetes mellitus (CMS/[facility]) 09/06/2023     Allergies:  No Known Allergies  PTA/Current Medications:  (Not in a hospital admission)    Current Outpatient Medications   Medication Sig Dispense Refill    acyclovir (Zovirax) 400 mg tablet Take 1 tablet (400 mg) by mouth 2 times a day.      amLODIPine (Norvasc) 5 mg tablet Take 2 tablets (10 mg) by mouth once daily. 90 tablet 3    atorvastatin (Lipitor) 80 mg tablet Take 1 tablet (80 mg) by mouth once daily at bedtime. 90 tablet 1    empagliflozin  (Jardiance) 10 mg Take 1 tablet (10 mg) by mouth once daily. 30 tablet 2    metFORMIN XR (Glucophage-XR) 500 mg 24 hr tablet Take 2 tablets (1,000 mg) by mouth once daily in the evening. Take with meals. Do not crush, chew, or split. 60 tablet 5     No current facility-administered medications for this encounter.     Past Surgical History:   has no past surgical history on file.    Recent sedation/surgery (24 hours) No    Review of Systems:  Please check all that apply: No significant medical history        NPO guidelines met: Yes    Physical Exam    Airway  Mallampati: II     Cardiovascular - normal exam     Dental    Pulmonary - normal exam         Plan    ASA 1     Moderate       Plan for colonoscopy

## 2023-11-15 ENCOUNTER — OFFICE VISIT (OUTPATIENT)
Dept: OPHTHALMOLOGY | Facility: CLINIC | Age: 53
End: 2023-11-15
Payer: COMMERCIAL

## 2023-11-15 DIAGNOSIS — E11.3313 MODERATE NONPROLIFERATIVE DIABETIC RETINOPATHY OF BOTH EYES WITH MACULAR EDEMA ASSOCIATED WITH TYPE 2 DIABETES MELLITUS (MULTI): Primary | ICD-10-CM

## 2023-11-15 DIAGNOSIS — H25.813 COMBINED FORMS OF AGE-RELATED CATARACT OF BOTH EYES: ICD-10-CM

## 2023-11-15 PROCEDURE — 99214 OFFICE O/P EST MOD 30 MIN: CPT | Performed by: OPTOMETRIST

## 2023-11-15 PROCEDURE — 92134 CPTRZ OPH DX IMG PST SGM RTA: CPT | Mod: BILATERAL PROCEDURE | Performed by: OPTOMETRIST

## 2023-11-15 ASSESSMENT — VISUAL ACUITY
METHOD: SNELLEN - LINEAR
OS_SC+: +1
OD_PH_SC+: +1
OS_SC: 20/70
OS_PH_SC: 20/30
OD_SC+: -2
OD_PH_SC: 20/30
OS_PH_SC+: -2
OD_SC: 20/50

## 2023-11-15 ASSESSMENT — TONOMETRY
OS_IOP_MMHG: 20
IOP_METHOD: GOLDMANN APPLANATION
OD_IOP_MMHG: 18

## 2023-11-15 ASSESSMENT — ENCOUNTER SYMPTOMS
GASTROINTESTINAL NEGATIVE: 0
CONSTITUTIONAL NEGATIVE: 0
EYES NEGATIVE: 0
CARDIOVASCULAR NEGATIVE: 0
ALLERGIC/IMMUNOLOGIC NEGATIVE: 0
MUSCULOSKELETAL NEGATIVE: 0
RESPIRATORY NEGATIVE: 0
HEMATOLOGIC/LYMPHATIC NEGATIVE: 0
ENDOCRINE NEGATIVE: 1
NEUROLOGICAL NEGATIVE: 0
PSYCHIATRIC NEGATIVE: 0

## 2023-11-15 ASSESSMENT — SLIT LAMP EXAM - LIDS
COMMENTS: NORMAL
COMMENTS: NORMAL

## 2023-11-15 ASSESSMENT — CONF VISUAL FIELD
OS_NORMAL: 1
OS_SUPERIOR_NASAL_RESTRICTION: 0
OS_INFERIOR_NASAL_RESTRICTION: 0
OS_SUPERIOR_TEMPORAL_RESTRICTION: 0
METHOD: COUNTING FINGERS
OD_SUPERIOR_NASAL_RESTRICTION: 0
OD_SUPERIOR_TEMPORAL_RESTRICTION: 0
OD_INFERIOR_NASAL_RESTRICTION: 0
OD_NORMAL: 1
OS_INFERIOR_TEMPORAL_RESTRICTION: 0
OD_INFERIOR_TEMPORAL_RESTRICTION: 0

## 2023-11-15 ASSESSMENT — CUP TO DISC RATIO
OD_RATIO: 0.3
OS_RATIO: 0.3

## 2023-11-15 ASSESSMENT — EXTERNAL EXAM - RIGHT EYE: OD_EXAM: NORMAL

## 2023-11-15 ASSESSMENT — EXTERNAL EXAM - LEFT EYE: OS_EXAM: NORMAL

## 2023-11-15 NOTE — PROGRESS NOTES
Assessment/Plan   Diagnoses and all orders for this visit:  Moderate nonproliferative diabetic retinopathy of both eyes with macular edema associated with type 2 diabetes mellitus (CMS/HCC)  -     OCT, Retina - OU - Both Eyes  Recommend consult retina as pt is interested in cataract surgery. Continue w/ tight BGLc control as well as control of BP, cholesterol. Healthy diet and exercise.   Combined forms of age-related cataract of both eyes  Patient's cataracts are visually significant. Recommend cataract evaluation for surgery. Patient agrees. Referral placed.

## 2023-11-15 NOTE — Clinical Note
Moderate diabetic retinopathy (DR) with mild cystoid macular edema (CME). Referral to retina placed.

## 2023-12-14 ENCOUNTER — OFFICE VISIT (OUTPATIENT)
Dept: PODIATRY | Facility: CLINIC | Age: 53
End: 2023-12-14
Payer: COMMERCIAL

## 2023-12-14 DIAGNOSIS — L60.0 INGROWN TOENAIL: ICD-10-CM

## 2023-12-14 DIAGNOSIS — M79.671 PAIN IN BOTH FEET: ICD-10-CM

## 2023-12-14 DIAGNOSIS — E11.49 TYPE II DIABETES MELLITUS WITH NEUROLOGICAL MANIFESTATIONS (MULTI): ICD-10-CM

## 2023-12-14 DIAGNOSIS — B35.1 ONYCHOMYCOSIS: ICD-10-CM

## 2023-12-14 DIAGNOSIS — M79.672 PAIN IN BOTH FEET: ICD-10-CM

## 2023-12-14 DIAGNOSIS — L03.032 ACUTE PARONYCHIA OF TOE OF LEFT FOOT: Primary | ICD-10-CM

## 2023-12-14 PROCEDURE — 3046F HEMOGLOBIN A1C LEVEL >9.0%: CPT | Performed by: PODIATRIST

## 2023-12-14 PROCEDURE — 1036F TOBACCO NON-USER: CPT | Performed by: PODIATRIST

## 2023-12-14 PROCEDURE — 99214 OFFICE O/P EST MOD 30 MIN: CPT | Performed by: PODIATRIST

## 2023-12-14 PROCEDURE — 3048F LDL-C <100 MG/DL: CPT | Performed by: PODIATRIST

## 2023-12-14 PROCEDURE — 3062F POS MACROALBUMINURIA REV: CPT | Performed by: PODIATRIST

## 2023-12-14 RX ORDER — CEPHALEXIN 500 MG/1
500 TABLET ORAL 3 TIMES DAILY
Qty: 21 TABLET | Refills: 0 | Status: SHIPPED | OUTPATIENT
Start: 2023-12-14 | End: 2023-12-21

## 2023-12-14 NOTE — PROGRESS NOTES
"Chief Complaint   Patient presents with    DM Foot Care     LUCIUS      Patient complains of painful elongated toenails, requests debridement.  Patient does complain of tingling in the digits.  He states that his glucose is \"doing better \"and is due to see his primary care for follow-up in January.  Patient has spoken to one of his doctors about possibly going on oral oral gabapentin but is concerned about some of the side effects.      Physical exam  Patient alert, oriented, no acute distress     +2/4 pedal pulses B/L.   Feet warm to touch. (-)hair growth B/L.   Mild LE edema B/L and multiple varicosities.   No weeping, no bulla noted B/L.  Neuropathy remains B/L.  Diminished vibratory sensation B/L  Light touch intact B/L.   Onychocryptosis to all nails noted.  Pain on palpation of hallux nail borders.  There is dried drainage noted along with localized erythema and edema to the distal medial left hallux nail border.  No purulence is appreciated.  No calor is noted.  Slight yellow, thick distal nails B/L, most involved is the hallux nails, No subungual debris noted.  Gross motor is intact B/L.    Assessment and plan  #1 onychocryptosis  Slant back debridement of hallux borders  Patient may need surgical nail procedure if pain remains  Follow up 2-3 months     #2  Acute paronychia left hallux  Slant back offending nail border  Patient to continue with topical antibiotic ointment and bandage and to until resolved  Rx: Cephalexin 500 mg  Patient to call if is not resolved in 1 week     #3 DM with peripheral neuropathy  Control glucose.  Avoid barefoot walking  Continue with daily foot checks.  Rx: Topical neuropathic cream  Follow-up 2-3 months     #4 Thick yellow nails bilaterally  May be early fungal infection  Can take nail biopsy as needed  Debrided all nails in length at this time  Follow-up 2-3 months   "

## 2023-12-19 ENCOUNTER — TELEPHONE (OUTPATIENT)
Dept: PRIMARY CARE | Facility: HOSPITAL | Age: 53
End: 2023-12-19
Payer: COMMERCIAL

## 2023-12-19 DIAGNOSIS — E13.69 OTHER SPECIFIED DIABETES MELLITUS WITH OTHER SPECIFIED COMPLICATION, UNSPECIFIED WHETHER LONG TERM INSULIN USE (MULTI): Primary | ICD-10-CM

## 2023-12-19 DIAGNOSIS — E11.42 TYPE 2 DIABETES MELLITUS WITH DIABETIC POLYNEUROPATHY, WITHOUT LONG-TERM CURRENT USE OF INSULIN (MULTI): ICD-10-CM

## 2023-12-27 ENCOUNTER — TELEPHONE (OUTPATIENT)
Dept: PRIMARY CARE | Facility: HOSPITAL | Age: 53
End: 2023-12-27
Payer: COMMERCIAL

## 2023-12-27 DIAGNOSIS — E11.42 TYPE 2 DIABETES MELLITUS WITH DIABETIC POLYNEUROPATHY, WITHOUT LONG-TERM CURRENT USE OF INSULIN (MULTI): ICD-10-CM

## 2023-12-27 NOTE — TELEPHONE ENCOUNTER
Pharmacy called in wanting to know if the patient can get a 90 day supply instead on his Jardiance and [facility], please and thank you

## 2024-01-05 ENCOUNTER — OFFICE VISIT (OUTPATIENT)
Dept: OPHTHALMOLOGY | Facility: CLINIC | Age: 54
End: 2024-01-05
Payer: COMMERCIAL

## 2024-01-05 ENCOUNTER — APPOINTMENT (OUTPATIENT)
Dept: OPHTHALMOLOGY | Facility: CLINIC | Age: 54
End: 2024-01-05
Payer: COMMERCIAL

## 2024-01-05 DIAGNOSIS — E11.3311 MODERATE NONPROLIFERATIVE DIABETIC RETINOPATHY OF RIGHT EYE WITH MACULAR EDEMA ASSOCIATED WITH TYPE 2 DIABETES MELLITUS (MULTI): ICD-10-CM

## 2024-01-05 DIAGNOSIS — E11.311: ICD-10-CM

## 2024-01-05 DIAGNOSIS — H25.813 COMBINED FORMS OF AGE-RELATED CATARACT OF BOTH EYES: Primary | ICD-10-CM

## 2024-01-05 DIAGNOSIS — E11.3552 STABLE PROLIFERATIVE DIABETIC RETINOPATHY OF LEFT EYE ASSOCIATED WITH TYPE 2 DIABETES MELLITUS (MULTI): ICD-10-CM

## 2024-01-05 PROCEDURE — 92134 CPTRZ OPH DX IMG PST SGM RTA: CPT | Mod: BILATERAL PROCEDURE | Performed by: OPHTHALMOLOGY

## 2024-01-05 PROCEDURE — 99214 OFFICE O/P EST MOD 30 MIN: CPT | Performed by: OPHTHALMOLOGY

## 2024-01-05 PROCEDURE — 92136 OPHTHALMIC BIOMETRY: CPT | Mod: BILATERAL PROCEDURE | Performed by: OPHTHALMOLOGY

## 2024-01-05 PROCEDURE — 92136 OPHTHALMIC BIOMETRY: CPT | Performed by: OPHTHALMOLOGY

## 2024-01-05 ASSESSMENT — VISUAL ACUITY
OD_BAT_MED: 20/30-2
OD_SC+: +2
OS_SC: 20/50
OS_BAT_MED: 20/50+2
METHOD: SNELLEN - LINEAR
OS_SC+: -2
OD_SC: 20/40

## 2024-01-05 ASSESSMENT — CONF VISUAL FIELD
METHOD: COUNTING FINGERS
OD_INFERIOR_TEMPORAL_RESTRICTION: 0
OD_SUPERIOR_NASAL_RESTRICTION: 0
OD_NORMAL: 1
OS_INFERIOR_TEMPORAL_RESTRICTION: 0
OS_INFERIOR_NASAL_RESTRICTION: 0
OS_SUPERIOR_TEMPORAL_RESTRICTION: 0
OS_SUPERIOR_NASAL_RESTRICTION: 0
OS_NORMAL: 1
OD_INFERIOR_NASAL_RESTRICTION: 0
OD_SUPERIOR_TEMPORAL_RESTRICTION: 0

## 2024-01-05 ASSESSMENT — REFRACTION_MANIFEST
OS_CYLINDER: -1.50
OD_SPHERE: -0.25
OS_SPHERE: -0.50
OS_AXIS: 035
OD_CYLINDER: -0.75
OD_AXIS: 100

## 2024-01-05 ASSESSMENT — EXTERNAL EXAM - RIGHT EYE: OD_EXAM: NORMAL

## 2024-01-05 ASSESSMENT — TONOMETRY
OS_IOP_MMHG: 14
OD_IOP_MMHG: 13
IOP_METHOD: GOLDMANN APPLANATION

## 2024-01-05 ASSESSMENT — EXTERNAL EXAM - LEFT EYE: OS_EXAM: NORMAL

## 2024-01-05 ASSESSMENT — CUP TO DISC RATIO
OS_RATIO: 0.3
OD_RATIO: 0.3

## 2024-01-05 ASSESSMENT — SLIT LAMP EXAM - LIDS
COMMENTS: NORMAL
COMMENTS: NORMAL

## 2024-01-05 NOTE — PROGRESS NOTES
Assessment/Plan   Diagnoses and all orders for this visit:  Combined forms of age-related cataract of both eyes  Visually significant  Lenstar done today  Defer CE sign up until after cleared by retina  Diabetic macular edema of both eyes with retinopathy associated with type 2 diabetes mellitus (CMS/HCC)  Moderate nonproliferative diabetic retinopathy of right eye with macular edema associated with type 2 diabetes mellitus (CMS/HCC)  Stable proliferative diabetic retinopathy of left eye associated with type 2 diabetes mellitus (CMS/HCC)  Possible neovascularization of the disc (NVD) OS  Has ME OU  Needs to see retina within 1 week  Fu with me for CE sign up after retina clearance

## 2024-01-09 ENCOUNTER — OFFICE VISIT (OUTPATIENT)
Dept: OPHTHALMOLOGY | Facility: CLINIC | Age: 54
End: 2024-01-09
Payer: COMMERCIAL

## 2024-01-09 DIAGNOSIS — E11.311: Primary | ICD-10-CM

## 2024-01-09 PROCEDURE — 92134 CPTRZ OPH DX IMG PST SGM RTA: CPT | Mod: BILATERAL PROCEDURE | Performed by: OPHTHALMOLOGY

## 2024-01-09 PROCEDURE — 1036F TOBACCO NON-USER: CPT | Performed by: OPHTHALMOLOGY

## 2024-01-09 PROCEDURE — 99214 OFFICE O/P EST MOD 30 MIN: CPT | Performed by: OPHTHALMOLOGY

## 2024-01-09 ASSESSMENT — VISUAL ACUITY
METHOD: SNELLEN - LINEAR
OS_SC: 20/50
OD_SC: 20/40

## 2024-01-09 ASSESSMENT — CONF VISUAL FIELD
OD_INFERIOR_TEMPORAL_RESTRICTION: 0
OS_INFERIOR_NASAL_RESTRICTION: 0
OS_NORMAL: 1
OD_INFERIOR_NASAL_RESTRICTION: 0
OS_SUPERIOR_NASAL_RESTRICTION: 0
OD_SUPERIOR_NASAL_RESTRICTION: 0
OS_INFERIOR_TEMPORAL_RESTRICTION: 0
OS_SUPERIOR_TEMPORAL_RESTRICTION: 0
OD_SUPERIOR_TEMPORAL_RESTRICTION: 0
OD_NORMAL: 1

## 2024-01-09 ASSESSMENT — TONOMETRY
OD_IOP_MMHG: 13
OS_IOP_MMHG: 13
IOP_METHOD: GOLDMANN APPLANATION

## 2024-01-09 ASSESSMENT — EXTERNAL EXAM - RIGHT EYE: OD_EXAM: NORMAL

## 2024-01-09 ASSESSMENT — ENCOUNTER SYMPTOMS
ENDOCRINE NEGATIVE: 1
EYES NEGATIVE: 1

## 2024-01-09 ASSESSMENT — SLIT LAMP EXAM - LIDS
COMMENTS: NORMAL
COMMENTS: NORMAL

## 2024-01-09 ASSESSMENT — EXTERNAL EXAM - LEFT EYE: OS_EXAM: NORMAL

## 2024-01-09 ASSESSMENT — CUP TO DISC RATIO
OS_RATIO: 0.3
OD_RATIO: 0.3

## 2024-01-09 NOTE — PROGRESS NOTES
Expand All Collapse All     Assessment/Plan   Diagnoses and all orders for this visit:  Combined forms of age-related cataract of both eyes  - Saw Dr Stone, will not proceed with CEIOL until retina stabilized.   Diabetic macular edema of both eyes with retinopathy associated with type 2 diabetes mellitus (CMS/HCC)  Moderate nonproliferative diabetic retinopathy of right eye with macular edema associated with type 2 diabetes mellitus (CMS/HCC)  Stable proliferative diabetic retinopathy of left eye associated with type 2 diabetes mellitus (CMS/HCC)      - A1c 10.0 10/10/23  - Likely neovascularization of the disc (NVD) in both eyes   - will plan for fluorescein angiography (FA) at next visit (left eye transit)  - follow up in 2-3 weeks with fluorescein angiography (FA) and possible treatment Ou. Eylea and avastin approved.      Mac OCT 01/09/24  Od: macular edema but preserved foveal contour.   Os: macular edema with distorted foveal contour.

## 2024-01-12 ENCOUNTER — OFFICE VISIT (OUTPATIENT)
Dept: PRIMARY CARE | Facility: CLINIC | Age: 54
End: 2024-01-12
Payer: COMMERCIAL

## 2024-01-12 ENCOUNTER — LAB (OUTPATIENT)
Dept: LAB | Facility: LAB | Age: 54
End: 2024-01-12
Payer: COMMERCIAL

## 2024-01-12 VITALS
SYSTOLIC BLOOD PRESSURE: 132 MMHG | DIASTOLIC BLOOD PRESSURE: 86 MMHG | WEIGHT: 233 LBS | HEART RATE: 104 BPM | BODY MASS INDEX: 35.31 KG/M2 | HEIGHT: 68 IN | TEMPERATURE: 97.3 F | OXYGEN SATURATION: 98 %

## 2024-01-12 DIAGNOSIS — E11.42 TYPE 2 DIABETES MELLITUS WITH DIABETIC POLYNEUROPATHY, WITHOUT LONG-TERM CURRENT USE OF INSULIN (MULTI): Primary | ICD-10-CM

## 2024-01-12 DIAGNOSIS — E11.42 TYPE 2 DIABETES MELLITUS WITH DIABETIC POLYNEUROPATHY, WITHOUT LONG-TERM CURRENT USE OF INSULIN (MULTI): ICD-10-CM

## 2024-01-12 DIAGNOSIS — I10 PRIMARY HYPERTENSION: ICD-10-CM

## 2024-01-12 LAB
EST. AVERAGE GLUCOSE BLD GHB EST-MCNC: 255 MG/DL
HBA1C MFR BLD: 10.5 %

## 2024-01-12 PROCEDURE — 3075F SYST BP GE 130 - 139MM HG: CPT | Performed by: STUDENT IN AN ORGANIZED HEALTH CARE EDUCATION/TRAINING PROGRAM

## 2024-01-12 PROCEDURE — 3079F DIAST BP 80-89 MM HG: CPT | Performed by: STUDENT IN AN ORGANIZED HEALTH CARE EDUCATION/TRAINING PROGRAM

## 2024-01-12 PROCEDURE — 99214 OFFICE O/P EST MOD 30 MIN: CPT | Performed by: STUDENT IN AN ORGANIZED HEALTH CARE EDUCATION/TRAINING PROGRAM

## 2024-01-12 PROCEDURE — 83036 HEMOGLOBIN GLYCOSYLATED A1C: CPT

## 2024-01-12 PROCEDURE — 4010F ACE/ARB THERAPY RXD/TAKEN: CPT | Performed by: STUDENT IN AN ORGANIZED HEALTH CARE EDUCATION/TRAINING PROGRAM

## 2024-01-12 PROCEDURE — 36415 COLL VENOUS BLD VENIPUNCTURE: CPT

## 2024-01-12 RX ORDER — OLMESARTAN MEDOXOMIL 20 MG/1
20 TABLET ORAL DAILY
Qty: 30 TABLET | Refills: 5 | Status: SHIPPED | OUTPATIENT
Start: 2024-01-12 | End: 2024-02-22 | Stop reason: SDUPTHER

## 2024-01-12 RX ORDER — OLMESARTAN MEDOXOMIL 20 MG/1
20 TABLET ORAL DAILY
Qty: 30 TABLET | Refills: 5 | Status: SHIPPED | OUTPATIENT
Start: 2024-01-12 | End: 2024-01-12 | Stop reason: SDUPTHER

## 2024-01-12 ASSESSMENT — PAIN SCALES - GENERAL: PAINLEVEL: 0-NO PAIN

## 2024-01-12 ASSESSMENT — ENCOUNTER SYMPTOMS
DEPRESSION: 0
LOSS OF SENSATION IN FEET: 1
OCCASIONAL FEELINGS OF UNSTEADINESS: 0

## 2024-01-12 NOTE — PROGRESS NOTES
Following up from today's visit. A1c remains elevated to 10.5%. Increasing Jardiance from 10 mg to 25 mg daily. Patient OK to take 20 mg (two of 10 mg tablets) until gone, then new Rx.    Patient is on max tolerable dose of metformin XR 1000 mg at night. Has had GI side effects to regular formulation that were severe. SFX less severe with XR.

## 2024-01-12 NOTE — PROGRESS NOTES
"  Subjective   Patient ID: Yrn Rock is a 53 y.o. male who presents for Follow-up. Follow-up reason: HTN, cough, DM2.    Interval changes:  Started on Jardiance 10 mg by other primary.  Dose amlodipine increased from 5 to 10 mg daily.    54 y/o HTN, uncon DM2, retinal , neuropathy, dry cough    1. HTN  Amlo up to 10 mg, but peripheral edema  Had cough with lisinopril  Had gout with hydrochlorothiazide  Will rx olmesartan    2. DM2, poorly controlled  Started jardiance  Loose bvowels with metformin, changed to XR, better, but still there  A1 12.9% 6 months ago, then down to 10%  Urine albumin 2K      3. Cough  Dry  Years long  No sputum  No exacerbations by weather  Denies SOB       SH  MJ seldomly  Tobacco - none  Alcohol - normally 1-2 a month or two (beer)        Review of Systems   Cardiovascular:  Positive for leg swelling.   All other systems reviewed and are negative.      Objective   /86   Pulse 104   Temp 36.3 °C (97.3 °F)   Ht 1.727 m (5' 8\")   Wt 106 kg (233 lb)   SpO2 98%   BMI 35.43 kg/m²     Physical Exam  Vitals reviewed.   Constitutional:       General: He is not in acute distress.  HENT:      Head: Normocephalic and atraumatic.      Nose: Nose normal.      Mouth/Throat:      Mouth: Mucous membranes are moist.      Pharynx: Oropharynx is clear.   Eyes:      Extraocular Movements: Extraocular movements intact.      Conjunctiva/sclera: Conjunctivae normal.      Pupils: Pupils are equal, round, and reactive to light.   Cardiovascular:      Rate and Rhythm: Normal rate.      Pulses: Normal pulses.      Heart sounds: Normal heart sounds. No murmur heard.     No friction rub. No gallop.   Pulmonary:      Effort: Pulmonary effort is normal. No respiratory distress.      Breath sounds: Normal breath sounds. No stridor. No wheezing, rhonchi or rales.   Chest:      Chest wall: No tenderness.   Abdominal:      General: Abdomen is flat. Bowel sounds are normal.      Palpations: Abdomen is soft. "   Musculoskeletal:         General: Swelling present.      Cervical back: Normal range of motion and neck supple.      Comments: 2+ pitting edema to proximal shins bilaterally   Skin:     General: Skin is warm and dry.      Capillary Refill: Capillary refill takes less than 2 seconds.   Neurological:      General: No focal deficit present.      Mental Status: He is alert.   Psychiatric:         Mood and Affect: Mood normal.         Behavior: Behavior normal.         Assessment/Plan   Problem List Items Addressed This Visit             ICD-10-CM    Hypertension I10    Relevant Medications    olmesartan (BENIcar) 20 mg tablet    Other Relevant Orders    Follow Up In Primary Care     Other Visit Diagnoses         Codes    Type 2 diabetes mellitus with diabetic polyneuropathy, without long-term current use of insulin (CMS/[facility])    -  Primary E11.42    Relevant Orders    Hemoglobin A1c                   -------------------------------------------------------------------------------------------------------------------    **This note was entered into the electronic medical record using Dragon medical dictation software, and was not edited thereafter. Please excuse any errors of spelling or grammar.**    -------------------------------------------------------------------------------------------------------------------    OVERALL PLAN:  [ ] JUDI amlo, replace with olmesartan 10 mg for prim HTN and macroalbuminemia in setting of DM2 - FUV in 1 month for BP check and medication titration as needed.  [ ] A1c repeat today [ ] call re: increase jardiance  [ ] lower concern for HF at this time (lung exam clear, amlo likely cause of swelling)

## 2024-01-25 ENCOUNTER — LAB (OUTPATIENT)
Dept: LAB | Facility: LAB | Age: 54
End: 2024-01-25
Payer: COMMERCIAL

## 2024-01-25 ENCOUNTER — OFFICE VISIT (OUTPATIENT)
Dept: OPHTHALMOLOGY | Facility: CLINIC | Age: 54
End: 2024-01-25
Payer: COMMERCIAL

## 2024-01-25 DIAGNOSIS — E11.311: ICD-10-CM

## 2024-01-25 DIAGNOSIS — H35.63 RETINAL HEMORRHAGE OF BOTH EYES: Primary | ICD-10-CM

## 2024-01-25 DIAGNOSIS — H35.63 RETINAL HEMORRHAGE OF BOTH EYES: ICD-10-CM

## 2024-01-25 DIAGNOSIS — H54.3 DIABETIC VISUAL LOSS: BLINDNESS OF BOTH EYES, WITH MACULAR EDEMA, WITH PROLIFERATIVE RETINOPATHY, ASSOCIATED WITH TYPE 2 DIABETES MELLITUS (MULTI): ICD-10-CM

## 2024-01-25 DIAGNOSIS — E11.3513 DIABETIC VISUAL LOSS: BLINDNESS OF BOTH EYES, WITH MACULAR EDEMA, WITH PROLIFERATIVE RETINOPATHY, ASSOCIATED WITH TYPE 2 DIABETES MELLITUS (MULTI): ICD-10-CM

## 2024-01-25 LAB
ALBUMIN SERPL BCP-MCNC: 4.4 G/DL (ref 3.4–5)
ALP SERPL-CCNC: 61 U/L (ref 33–120)
ALT SERPL W P-5'-P-CCNC: 21 U/L (ref 10–52)
ANION GAP SERPL CALC-SCNC: 13 MMOL/L (ref 10–20)
AST SERPL W P-5'-P-CCNC: 15 U/L (ref 9–39)
BASOPHILS # BLD AUTO: 0.04 X10*3/UL (ref 0–0.1)
BASOPHILS NFR BLD AUTO: 0.5 %
BILIRUB SERPL-MCNC: 0.5 MG/DL (ref 0–1.2)
BUN SERPL-MCNC: 16 MG/DL (ref 6–23)
CALCIUM SERPL-MCNC: 10.1 MG/DL (ref 8.6–10.6)
CHLORIDE SERPL-SCNC: 103 MMOL/L (ref 98–107)
CO2 SERPL-SCNC: 28 MMOL/L (ref 21–32)
CREAT SERPL-MCNC: 0.94 MG/DL (ref 0.5–1.3)
CRP SERPL-MCNC: 0.12 MG/DL
EGFRCR SERPLBLD CKD-EPI 2021: >90 ML/MIN/1.73M*2
EOSINOPHIL # BLD AUTO: 0 X10*3/UL (ref 0–0.7)
EOSINOPHIL NFR BLD AUTO: 0 %
ERYTHROCYTE [DISTWIDTH] IN BLOOD BY AUTOMATED COUNT: 13.4 % (ref 11.5–14.5)
ERYTHROCYTE [SEDIMENTATION RATE] IN BLOOD BY WESTERGREN METHOD: 18 MM/H (ref 0–20)
GLUCOSE SERPL-MCNC: 169 MG/DL (ref 74–99)
HCT VFR BLD AUTO: 44.2 % (ref 41–52)
HGB BLD-MCNC: 15 G/DL (ref 13.5–17.5)
IMM GRANULOCYTES # BLD AUTO: 0.02 X10*3/UL (ref 0–0.7)
IMM GRANULOCYTES NFR BLD AUTO: 0.3 % (ref 0–0.9)
LYMPHOCYTES # BLD AUTO: 1.56 X10*3/UL (ref 1.2–4.8)
LYMPHOCYTES NFR BLD AUTO: 20.2 %
MCH RBC QN AUTO: 29.6 PG (ref 26–34)
MCHC RBC AUTO-ENTMCNC: 33.9 G/DL (ref 32–36)
MCV RBC AUTO: 87 FL (ref 80–100)
MONOCYTES # BLD AUTO: 0.59 X10*3/UL (ref 0.1–1)
MONOCYTES NFR BLD AUTO: 7.6 %
NEUTROPHILS # BLD AUTO: 5.51 X10*3/UL (ref 1.2–7.7)
NEUTROPHILS NFR BLD AUTO: 71.4 %
NRBC BLD-RTO: 0 /100 WBCS (ref 0–0)
PLATELET # BLD AUTO: 219 X10*3/UL (ref 150–450)
POTASSIUM SERPL-SCNC: 4.2 MMOL/L (ref 3.5–5.3)
PROT SERPL-MCNC: 7.5 G/DL (ref 6.4–8.2)
RBC # BLD AUTO: 5.06 X10*6/UL (ref 4.5–5.9)
SODIUM SERPL-SCNC: 140 MMOL/L (ref 136–145)
WBC # BLD AUTO: 7.7 X10*3/UL (ref 4.4–11.3)

## 2024-01-25 PROCEDURE — 86780 TREPONEMA PALLIDUM: CPT

## 2024-01-25 PROCEDURE — 4010F ACE/ARB THERAPY RXD/TAKEN: CPT | Performed by: OPHTHALMOLOGY

## 2024-01-25 PROCEDURE — 82164 ANGIOTENSIN I ENZYME TEST: CPT

## 2024-01-25 PROCEDURE — 83516 IMMUNOASSAY NONANTIBODY: CPT

## 2024-01-25 PROCEDURE — 86038 ANTINUCLEAR ANTIBODIES: CPT

## 2024-01-25 PROCEDURE — 3046F HEMOGLOBIN A1C LEVEL >9.0%: CPT | Performed by: OPHTHALMOLOGY

## 2024-01-25 PROCEDURE — 80053 COMPREHEN METABOLIC PANEL: CPT

## 2024-01-25 PROCEDURE — 86036 ANCA SCREEN EACH ANTIBODY: CPT

## 2024-01-25 PROCEDURE — 92134 CPTRZ OPH DX IMG PST SGM RTA: CPT | Mod: BILATERAL PROCEDURE | Performed by: OPHTHALMOLOGY

## 2024-01-25 PROCEDURE — 81381 HLA I TYPING 1 ALLELE HR: CPT

## 2024-01-25 PROCEDURE — 85652 RBC SED RATE AUTOMATED: CPT

## 2024-01-25 PROCEDURE — 36415 COLL VENOUS BLD VENIPUNCTURE: CPT

## 2024-01-25 PROCEDURE — 86140 C-REACTIVE PROTEIN: CPT

## 2024-01-25 PROCEDURE — 86695 HERPES SIMPLEX TYPE 1 TEST: CPT

## 2024-01-25 PROCEDURE — 99214 OFFICE O/P EST MOD 30 MIN: CPT | Performed by: OPHTHALMOLOGY

## 2024-01-25 PROCEDURE — 85025 COMPLETE CBC W/AUTO DIFF WBC: CPT

## 2024-01-25 PROCEDURE — 86481 TB AG RESPONSE T-CELL SUSP: CPT

## 2024-01-25 PROCEDURE — 92235 FLUORESCEIN ANGRPH MLTIFRAME: CPT | Mod: BILATERAL PROCEDURE | Performed by: OPHTHALMOLOGY

## 2024-01-25 PROCEDURE — 67028 INJECTION EYE DRUG: CPT | Mod: BILATERAL PROCEDURE | Performed by: OPHTHALMOLOGY

## 2024-01-25 PROCEDURE — 86696 HERPES SIMPLEX TYPE 2 TEST: CPT

## 2024-01-25 RX ADMIN — FLUORESCEIN 500 MG: 500 INJECTION INTRAVENOUS at 16:07

## 2024-01-25 ASSESSMENT — VISUAL ACUITY
CORRECTION_TYPE: GLASSES
METHOD: SNELLEN - LINEAR
OD_SC: 20/40-2
OS_SC: 20/50+2

## 2024-01-25 ASSESSMENT — CONF VISUAL FIELD
OD_INFERIOR_NASAL_RESTRICTION: 0
OS_NORMAL: 1
OD_INFERIOR_TEMPORAL_RESTRICTION: 0
OD_SUPERIOR_NASAL_RESTRICTION: 0
OD_SUPERIOR_TEMPORAL_RESTRICTION: 0
OS_SUPERIOR_TEMPORAL_RESTRICTION: 0
OS_INFERIOR_NASAL_RESTRICTION: 0
OS_INFERIOR_TEMPORAL_RESTRICTION: 0
OS_SUPERIOR_NASAL_RESTRICTION: 0
OD_NORMAL: 1

## 2024-01-25 ASSESSMENT — TONOMETRY
IOP_METHOD: GOLDMANN APPLANATION
OD_IOP_MMHG: 18
OS_IOP_MMHG: 18

## 2024-01-25 NOTE — PROGRESS NOTES
Assessment/Plan   Diagnoses and all orders for this visit:  Combined forms of age-related cataract of both eyes  - Saw Dr Stone, will not proceed with CEIOL until retina stabilized.   Diabetic macular edema of both eyes with retinopathy associated with type 2 diabetes mellitus (CMS/HCC)  Moderate nonproliferative diabetic retinopathy of right eye with macular edema associated with type 2 diabetes mellitus (CMS/HCC)  Stable proliferative diabetic retinopathy of left eye associated with type 2 diabetes mellitus (CMS/HCC)      - A1c 10.0 10/10/23  - Likely neovascularization of the disc (NVD) in both eyes   - will plan for fluorescein angiography (FA) at next visit (left eye transit)  - follow up in 2-3 weeks with fluorescein angiography (FA) and possible treatment Ou. Eylea and avastin approved.        Mac OCT 01/09/24  Od: macular edema but preserved foveal contour.   Os: macular edema with distorted foveal contour.      Fluorescein angiography (FA) does not appear diabetic no neovascularization of the disc (NVD) OU  Hypertensive etiology vs uveitis  Will send for uveitis panel  Return to clinic 1 month    DIAGNOSTIC PROCEDURE DONE    OCT DONE OD/OS            REASON FOR TEST: will help address and tailor  therapy by detecting subclinical CME SRF     Hi quality OCT  scans obtained  signal good    OCT OD - Normal Foveal Contour, Edema, IS/OS Junction Normal  OCT OS - Normal Foveal Contour,  Edema, IS/OS Junction Normal    additional commnents:      Treat both eyes  Treatment options for DME OU discussed, including observation, anti-VEGF injections (including Avastin, Lucentis, Beovu, Vabysmo and Eylea) and laser. Recommend anti-VEGF injections. Eylea done OUtoday in a sterile manner with Betadine 5% for antisepsis.   Labs     1m

## 2024-01-26 LAB
ANA SER QL HEP2 SUBST: NEGATIVE
HERPES SIMPLEX VIRUS 1 IGG: <0.2 INDEX
HERPES SIMPLEX VIRUS 2 IGG: 7.4 INDEX
TREPONEMA PALLIDUM IGG+IGM AB [PRESENCE] IN SERUM OR PLASMA BY IMMUNOASSAY: NONREACTIVE

## 2024-01-26 RX ORDER — FLUORESCEIN 500 MG/ML
500 INJECTION INTRAVENOUS
Status: COMPLETED | OUTPATIENT
Start: 2024-01-25 | End: 2024-01-25

## 2024-01-26 ASSESSMENT — CUP TO DISC RATIO
OS_RATIO: 0.3
OD_RATIO: 0.3

## 2024-01-26 ASSESSMENT — SLIT LAMP EXAM - LIDS
COMMENTS: NORMAL
COMMENTS: NORMAL

## 2024-01-26 ASSESSMENT — EXTERNAL EXAM - RIGHT EYE: OD_EXAM: NORMAL

## 2024-01-26 ASSESSMENT — EXTERNAL EXAM - LEFT EYE: OS_EXAM: NORMAL

## 2024-01-27 LAB
ACE SERPL-CCNC: 20 U/L (ref 16–85)
NIL(NEG) CONTROL SPOT COUNT: NORMAL
PANEL A SPOT COUNT: 0
PANEL B SPOT COUNT: 0
POS CONTROL SPOT COUNT: NORMAL
T-SPOT. TB INTERPRETATION: NEGATIVE

## 2024-01-29 LAB
ANCA AB PATTERN SER IF-IMP: NORMAL
ANCA IGG TITR SER IF: NORMAL {TITER}
MYELOPEROXIDASE AB SER-ACNC: 0 AU/ML (ref 0–19)
PROTEINASE3 AB SER-ACNC: 1 AU/ML (ref 0–19)

## 2024-01-31 LAB — HLAB27 TYPING: NEGATIVE

## 2024-02-15 ENCOUNTER — PROCEDURE VISIT (OUTPATIENT)
Dept: PODIATRY | Facility: CLINIC | Age: 54
End: 2024-02-15
Payer: COMMERCIAL

## 2024-02-15 DIAGNOSIS — B35.1 ONYCHOMYCOSIS: ICD-10-CM

## 2024-02-15 DIAGNOSIS — M79.672 PAIN IN BOTH FEET: ICD-10-CM

## 2024-02-15 DIAGNOSIS — M79.671 PAIN IN BOTH FEET: ICD-10-CM

## 2024-02-15 DIAGNOSIS — L60.0 INGROWN TOENAIL: Primary | ICD-10-CM

## 2024-02-15 DIAGNOSIS — E11.49 TYPE II DIABETES MELLITUS WITH NEUROLOGICAL MANIFESTATIONS (MULTI): ICD-10-CM

## 2024-02-15 PROCEDURE — 99213 OFFICE O/P EST LOW 20 MIN: CPT | Performed by: PODIATRIST

## 2024-02-15 NOTE — PROGRESS NOTES
Chief Complaint   Patient presents with    DM Foot Care     Patient is here today for diabetic foot care.      Patient complains of painful elongated toenails, requests debridement.  Patient does complain of tingling in the digits.  Patient states that his diabetic medications and doses were recently adjusted.  Patient did receive the topical combination cream but has not tried it yet.    Physical exam  Patient alert, oriented, no acute distress     +2/4 pedal pulses B/L.   Feet warm to touch.   (-)hair growth B/L.   Mild LE edema B/L and multiple varicosities.   No weeping, no bulla noted B/L.  Neuropathy remains B/L.  Diminished vibratory sensation B/L  Light touch intact B/L.   Onychocryptosis to all nails noted.  Pain on palpation of hallux nail borders.  No acute paronychia is noted B/L  Slight yellow, thick distal nails B/L, most involved is the hallux nails, No subungual debris noted.  Gross motor is intact B/L.    Lab Results   Component Value Date    HGBA1C 10.5 (H) 01/12/2024      Assessment and plan  #1 onychocryptosis  Slant back debridement of hallux borders  Patient may need surgical nail procedure if pain remains  Follow up 2-3 months     #2 DM with peripheral neuropathy  Control glucose.  Avoid barefoot walking  Continue with daily foot checks.  Topical neuropathic cream as needed  Reviewed last hemoglobin A1c  Follow-up 2-3 months     #3 Thick yellow nails bilaterally  May be early fungal infection vs mild dystrophic  Can take nail biopsy as needed  Debrided all nails in length at this time  Follow-up 2-3 months

## 2024-02-22 ENCOUNTER — OFFICE VISIT (OUTPATIENT)
Dept: PRIMARY CARE | Facility: CLINIC | Age: 54
End: 2024-02-22
Payer: COMMERCIAL

## 2024-02-22 ENCOUNTER — HOSPITAL ENCOUNTER (OUTPATIENT)
Dept: RADIOLOGY | Facility: HOSPITAL | Age: 54
Discharge: HOME | End: 2024-02-22
Payer: COMMERCIAL

## 2024-02-22 VITALS
TEMPERATURE: 96 F | OXYGEN SATURATION: 97 % | DIASTOLIC BLOOD PRESSURE: 113 MMHG | SYSTOLIC BLOOD PRESSURE: 178 MMHG | HEART RATE: 96 BPM | BODY MASS INDEX: 35.18 KG/M2 | HEIGHT: 68 IN | WEIGHT: 232.1 LBS

## 2024-02-22 DIAGNOSIS — I10 PRIMARY HYPERTENSION: Primary | ICD-10-CM

## 2024-02-22 DIAGNOSIS — R05.3 CHRONIC COUGH: ICD-10-CM

## 2024-02-22 DIAGNOSIS — R60.9 2+ PITTING EDEMA: ICD-10-CM

## 2024-02-22 DIAGNOSIS — E11.42 TYPE 2 DIABETES MELLITUS WITH DIABETIC POLYNEUROPATHY, WITHOUT LONG-TERM CURRENT USE OF INSULIN (MULTI): ICD-10-CM

## 2024-02-22 DIAGNOSIS — H35.63 RETINAL HEMORRHAGE OF BOTH EYES: ICD-10-CM

## 2024-02-22 DIAGNOSIS — Z23 ENCOUNTER FOR IMMUNIZATION: ICD-10-CM

## 2024-02-22 DIAGNOSIS — M75.22 BICEPS TENDINITIS OF LEFT SHOULDER: ICD-10-CM

## 2024-02-22 PROCEDURE — 71046 X-RAY EXAM CHEST 2 VIEWS: CPT | Performed by: RADIOLOGY

## 2024-02-22 PROCEDURE — 3077F SYST BP >= 140 MM HG: CPT

## 2024-02-22 PROCEDURE — 90677 PCV20 VACCINE IM: CPT

## 2024-02-22 PROCEDURE — 99214 OFFICE O/P EST MOD 30 MIN: CPT

## 2024-02-22 PROCEDURE — 3046F HEMOGLOBIN A1C LEVEL >9.0%: CPT

## 2024-02-22 PROCEDURE — 4010F ACE/ARB THERAPY RXD/TAKEN: CPT

## 2024-02-22 PROCEDURE — 90471 IMMUNIZATION ADMIN: CPT

## 2024-02-22 PROCEDURE — 71046 X-RAY EXAM CHEST 2 VIEWS: CPT

## 2024-02-22 PROCEDURE — 3080F DIAST BP >= 90 MM HG: CPT

## 2024-02-22 RX ORDER — LANCETS
EACH MISCELLANEOUS
Qty: 100 EACH | Refills: 3 | Status: SHIPPED | OUTPATIENT
Start: 2024-02-22

## 2024-02-22 RX ORDER — BLOOD-GLUCOSE,RECEIVER,CONT
EACH MISCELLANEOUS
Qty: 1 EACH | Refills: 0 | Status: SHIPPED | OUTPATIENT
Start: 2024-02-22

## 2024-02-22 RX ORDER — OLMESARTAN MEDOXOMIL 40 MG/1
40 TABLET ORAL DAILY
Qty: 30 TABLET | Refills: 5 | Status: SHIPPED | OUTPATIENT
Start: 2024-02-22 | End: 2024-08-20

## 2024-02-22 RX ORDER — ISOPROPYL ALCOHOL 70 ML/100ML
1 SWAB TOPICAL DAILY
Qty: 100 EACH | Refills: 3 | Status: SHIPPED | OUTPATIENT
Start: 2024-02-22

## 2024-02-22 RX ORDER — METFORMIN HYDROCHLORIDE 500 MG/1
1000 TABLET, EXTENDED RELEASE ORAL
Qty: 120 TABLET | Refills: 5 | Status: SHIPPED | OUTPATIENT
Start: 2024-02-22 | End: 2024-08-20

## 2024-02-22 RX ORDER — DEXTROSE 4 G
TABLET,CHEWABLE ORAL
Qty: 1 EACH | Refills: 0 | Status: SHIPPED | OUTPATIENT
Start: 2024-02-22

## 2024-02-22 RX ORDER — BLOOD-GLUCOSE SENSOR
EACH MISCELLANEOUS
Qty: 1 EACH | Refills: 0 | Status: SHIPPED | OUTPATIENT
Start: 2024-02-22

## 2024-02-22 ASSESSMENT — PAIN SCALES - GENERAL: PAINLEVEL: 0-NO PAIN

## 2024-02-22 NOTE — PROGRESS NOTES
"Subjective   Patient ID: Yrn Rock is a 53 y.o. male who presents for Follow-up.    HPI   HTN  - denies CP, headache, leg swelling, vision changes, palpitations   - endorse compliance with olmesartan has been on lisinopril and losartan previously   - reports that has had issues previously with diuretic therapy causing gout     T2DM  - not checking blood sugars at home, has an old blood glucose monitor at home, unsure if still works or where exactly device is    -endorse compliance with metformin   - last hemoglin A1C 10.5 %    Left shoulder pain   - started to notice more but has been ongoing for awhile  - not very painful, but can be more of dull ache  - does not limit ROM    Dry cough  - reports that he has had an ongoing cough  - nonproductive  - reports mainly just bothersome   - patient reports that another provider has order him a chest x-ray and is wondering where he can go following appointment today to get that done     Reports got flu shot, has not gotten most recently COVID    Review of Systems  ROS negative unless noted in HPI     Objective   BP (!) 178/113   Pulse 96   Temp 35.6 °C (96 °F) (Tympanic)   Ht 1.727 m (5' 8\")   Wt 105 kg (232 lb 1.6 oz)   SpO2 97%   BMI 35.29 kg/m²     185/118 on repeat 178/113      Physical Exam  Vitals reviewed.   Constitutional:       General: He is not in acute distress.     Appearance: Normal appearance. He is obese. He is not ill-appearing, toxic-appearing or diaphoretic.   HENT:      Head: Normocephalic and atraumatic.      Right Ear: External ear normal.      Left Ear: External ear normal.      Nose: Nose normal.      Mouth/Throat:      Mouth: Mucous membranes are moist.      Pharynx: Oropharynx is clear.   Eyes:      Conjunctiva/sclera: Conjunctivae normal.   Cardiovascular:      Rate and Rhythm: Normal rate and regular rhythm.      Pulses: Normal pulses.      Heart sounds: Normal heart sounds. No murmur heard.     No friction rub. No gallop.   Pulmonary: "      Effort: Pulmonary effort is normal. No respiratory distress.      Breath sounds: Normal breath sounds. No stridor. No wheezing, rhonchi or rales.   Chest:      Chest wall: No tenderness.   Abdominal:      General: Abdomen is flat. Bowel sounds are normal. There is no distension.      Palpations: Abdomen is soft.      Tenderness: There is no abdominal tenderness. There is no guarding.   Musculoskeletal:         General: Normal range of motion.      Right shoulder: Normal.      Left shoulder: Tenderness present. No effusion, bony tenderness or crepitus. Normal range of motion. Normal strength. Normal pulse.      Right elbow: Normal.      Left elbow: Normal.      Cervical back: Normal range of motion and neck supple. No rigidity or tenderness.      Right lower leg: Edema present.      Left lower leg: Edema present.      Comments: 2+ Edema b/l midway up the shin    Lymphadenopathy:      Cervical: No cervical adenopathy.   Skin:     General: Skin is warm and dry.   Neurological:      General: No focal deficit present.      Mental Status: He is alert and oriented to person, place, and time.      Cranial Nerves: No cranial nerve deficit.      Sensory: No sensory deficit.      Motor: No weakness.   Psychiatric:         Mood and Affect: Mood normal.         Behavior: Behavior normal.         Thought Content: Thought content normal.         Judgment: Judgment normal.         Assessment/Plan   Yrn Rock is 52 y/o M w/ uncontrolled HTN, HLD, T2DM (A1C 10.5%, 1/12/24), obesity (BMI 35.29), chronic b/l LE edema, chronic non-productive cough, HSV, and umbilical hernia who presented today for follow up in regards to uncontrolled HTN, T2DM w/o use of insulin, and left shoulder pain.  On exam the patient is well appearing and in NAD.  Physical exam was significant for b/l LE 2+ pitting edema as well as elevated blood pressure, but without any crackles or rales on auscultation of lung fields.  Due to this finding, recommend  obtaining echocardiogram to evaluate for cardiac dysfunction.  In addition, recommended patient obtain previously order CXR that following appointment and would follow up with results due to patient concerns of chronic non-productive cough.  Rest of plan below:    Diagnoses and all orders for this visit:  Primary hypertension  - IO BP: 185/118 on repeat 178/113, above goal of <130/80  - patient asx   - increased dose of olmesartan from 20mg every day to 40mg every day   - provided patient with blood pressure log, and recommend taking blood pressure at home in ambulatory setting to better evaluate BP  - recommend patient obtain blood pressure machine and check blood pressure in upper extremities above level of elbow   -     Follow Up In Primary Care  -     olmesartan (BENIcar) 40 mg tablet; Take 1 tablet (40 mg) by mouth once daily.  -     Transthoracic Echo (TTE) Complete; Future  Type 2 diabetes mellitus with diabetic polyneuropathy, without long-term current use of insulin (CMS/[facility])  - most recent hemoglobin A1C 10.5% on 1/12/24  - patient w/o glucose monitor at home, will order patient new monitor and diabetic supplies so that can check blood sugars  - c/w current regiment of metformin XR 500mg BID, and Jardiance 25mg every day   -     metFORMIN XR (Glucophage-XR) 500 mg 24 hr tablet; Take 2 tablets (1,000 mg) by mouth 2 times a day with meals. Do not crush, chew, or split.  -     FreeStyle Venu 3 [place] misc; Use as instructed  -     FreeStyle Venu 3 Sensor device; Use to check blood sugars 3x daily and with meals  -     blood-glucose meter misc; Please use to check blood sugar daily  -     lancets misc; Please use to check blood sugar  -     alcohol swabs pads, medicated; Apply 1 Units topically once daily.  2+ pitting edema  -     Transthoracic Echo (TTE) Complete; Future  Chronic cough  -     Transthoracic Echo (TTE) Complete; Future  Encounter for immunization  Biceps tendinitis of left shoulder  Other  orders  -     Pneumococcal conjugate vaccine, 20-valent (PREVNAR 20)  -     Follow Up In Primary Care - Established; Future     **RTC in 4 weeks for follow on uncontrolled HTN and multiple medical complaints or sooner if needed    Patient was examined and discussed with Dr. Richard Mahmood MD MS  PGY-2 Family Medicine

## 2024-02-22 NOTE — PROGRESS NOTES
Patient was seen and discussed with resident Ren Mahmood MD and with attending Dr. Ramos. Agreeable with plan as discussed.     DM2, uncontrolled, A1c 10.5% - c/w jardiance 25 mg every day, Increase Metformin from 1000 mg every day to 1000 mg BID  HTN, uncontrolled and increase olmesartan from 20 mg to 40 mg once daily  Agree with ordering an echocardiogram to evaluate for heart failure given significant lower extremity pitting edema.    Orders Placed This Encounter   Procedures    Pneumococcal conjugate vaccine, 20-valent (PREVNAR 20)    Transthoracic Echo (TTE) Complete        Mihir Robins MD  Family Medicine  PGY3    Yrn Rock    [phone]

## 2024-02-27 PROBLEM — M1A.00X0 CHRONIC IDIOPATHIC GOUT WITHOUT TOPHUS: Status: ACTIVE | Noted: 2023-05-04

## 2024-02-28 ENCOUNTER — OFFICE VISIT (OUTPATIENT)
Dept: OPHTHALMOLOGY | Facility: CLINIC | Age: 54
End: 2024-02-28
Payer: COMMERCIAL

## 2024-02-28 DIAGNOSIS — E11.3313 MODERATE NONPROLIFERATIVE DIABETIC RETINOPATHY OF BOTH EYES WITH MACULAR EDEMA ASSOCIATED WITH TYPE 2 DIABETES MELLITUS (MULTI): Primary | ICD-10-CM

## 2024-02-28 PROCEDURE — 99213 OFFICE O/P EST LOW 20 MIN: CPT | Performed by: OPHTHALMOLOGY

## 2024-02-28 PROCEDURE — 92134 CPTRZ OPH DX IMG PST SGM RTA: CPT | Performed by: OPHTHALMOLOGY

## 2024-02-28 PROCEDURE — 92235 FLUORESCEIN ANGRPH MLTIFRAME: CPT | Mod: BILATERAL PROCEDURE | Performed by: OPHTHALMOLOGY

## 2024-02-28 PROCEDURE — 67028 INJECTION EYE DRUG: CPT | Mod: BILATERAL PROCEDURE | Performed by: OPHTHALMOLOGY

## 2024-02-28 RX ORDER — FLUORESCEIN 500 MG/ML
500 INJECTION INTRAVENOUS
Status: COMPLETED | OUTPATIENT
Start: 2024-02-28 | End: 2024-02-28

## 2024-02-28 RX ADMIN — FLUORESCEIN 500 MG: 500 INJECTION INTRAVENOUS at 15:42

## 2024-02-28 ASSESSMENT — EXTERNAL EXAM - RIGHT EYE: OD_EXAM: NORMAL

## 2024-02-28 ASSESSMENT — TONOMETRY
OS_IOP_MMHG: 17
OD_IOP_MMHG: 17
IOP_METHOD: TONOPEN

## 2024-02-28 ASSESSMENT — ENCOUNTER SYMPTOMS
RESPIRATORY NEGATIVE: 0
HEMATOLOGIC/LYMPHATIC NEGATIVE: 0
ALLERGIC/IMMUNOLOGIC NEGATIVE: 0
ENDOCRINE NEGATIVE: 0
GASTROINTESTINAL NEGATIVE: 0
CONSTITUTIONAL NEGATIVE: 0
PSYCHIATRIC NEGATIVE: 0
CARDIOVASCULAR NEGATIVE: 0
EYES NEGATIVE: 0
MUSCULOSKELETAL NEGATIVE: 0
NEUROLOGICAL NEGATIVE: 0

## 2024-02-28 ASSESSMENT — CUP TO DISC RATIO
OD_RATIO: 0.3
OS_RATIO: 0.3

## 2024-02-28 ASSESSMENT — SLIT LAMP EXAM - LIDS
COMMENTS: NORMAL
COMMENTS: NORMAL

## 2024-02-28 ASSESSMENT — EXTERNAL EXAM - LEFT EYE: OS_EXAM: NORMAL

## 2024-02-28 ASSESSMENT — VISUAL ACUITY
OD_SC: 20/40
OS_SC+: +2
OS_SC: 20/50
METHOD: SNELLEN - LINEAR

## 2024-02-28 NOTE — PROGRESS NOTES
I saw and evaluated the patient. I personally obtained the key and critical portions of the history and physical exam or was physically present for key and critical portions performed by the resident/fellow. I reviewed the resident/fellow's documentation and discussed the patient with the resident/fellow. I agree with the resident/fellow's medical decision making as documented in the note.    Yrn Ramos MD

## 2024-03-15 ENCOUNTER — HOSPITAL ENCOUNTER (OUTPATIENT)
Dept: CARDIOLOGY | Facility: HOSPITAL | Age: 54
Discharge: HOME | End: 2024-03-15
Payer: COMMERCIAL

## 2024-03-15 DIAGNOSIS — R60.9 2+ PITTING EDEMA: ICD-10-CM

## 2024-03-15 DIAGNOSIS — E08.21 DIABETIC NEPHROPATHY ASSOCIATED WITH DIABETES MELLITUS DUE TO UNDERLYING CONDITION (MULTI): Primary | ICD-10-CM

## 2024-03-15 DIAGNOSIS — I10 PRIMARY HYPERTENSION: ICD-10-CM

## 2024-03-15 DIAGNOSIS — R05.3 CHRONIC COUGH: ICD-10-CM

## 2024-03-15 PROCEDURE — 93306 TTE W/DOPPLER COMPLETE: CPT | Performed by: INTERNAL MEDICINE

## 2024-03-15 PROCEDURE — 93306 TTE W/DOPPLER COMPLETE: CPT

## 2024-03-19 LAB
AORTIC VALVE PEAK VELOCITY: 1.47 M/S
AV PEAK GRADIENT: 8.6 MMHG
AVA (PEAK VEL): 2.2 CM2
EJECTION FRACTION APICAL 4 CHAMBER: 65
EJECTION FRACTION: 64 %
LEFT ATRIUM VOLUME AREA LENGTH INDEX BSA: 31.3 ML/M2
LEFT VENTRICLE INTERNAL DIMENSION DIASTOLE: 4.2 CM (ref 3.5–6)
LEFT VENTRICULAR OUTFLOW TRACT DIAMETER: 2 CM
MITRAL VALVE E/A RATIO: 0.81
MITRAL VALVE E/E' RATIO: 10.75
RIGHT VENTRICLE FREE WALL PEAK S': 12.5 CM/S
TRICUSPID ANNULAR PLANE SYSTOLIC EXCURSION: 2.7 CM

## 2024-04-15 ENCOUNTER — LAB (OUTPATIENT)
Dept: LAB | Facility: LAB | Age: 54
End: 2024-04-15
Payer: COMMERCIAL

## 2024-04-15 ENCOUNTER — OFFICE VISIT (OUTPATIENT)
Dept: PRIMARY CARE | Facility: CLINIC | Age: 54
End: 2024-04-15
Payer: COMMERCIAL

## 2024-04-15 VITALS
WEIGHT: 229.2 LBS | TEMPERATURE: 96.2 F | BODY MASS INDEX: 34.74 KG/M2 | HEIGHT: 68 IN | HEART RATE: 103 BPM | SYSTOLIC BLOOD PRESSURE: 179 MMHG | DIASTOLIC BLOOD PRESSURE: 117 MMHG | OXYGEN SATURATION: 99 %

## 2024-04-15 DIAGNOSIS — E11.9 DIABETES MELLITUS TYPE 2 WITHOUT RETINOPATHY (MULTI): Primary | ICD-10-CM

## 2024-04-15 DIAGNOSIS — I10 PRIMARY HYPERTENSION: ICD-10-CM

## 2024-04-15 DIAGNOSIS — E11.9 DIABETES MELLITUS TYPE 2 WITHOUT RETINOPATHY (MULTI): ICD-10-CM

## 2024-04-15 LAB
EST. AVERAGE GLUCOSE BLD GHB EST-MCNC: 197 MG/DL
HBA1C MFR BLD: 8.5 %

## 2024-04-15 PROCEDURE — 3077F SYST BP >= 140 MM HG: CPT | Performed by: STUDENT IN AN ORGANIZED HEALTH CARE EDUCATION/TRAINING PROGRAM

## 2024-04-15 PROCEDURE — 4010F ACE/ARB THERAPY RXD/TAKEN: CPT | Performed by: STUDENT IN AN ORGANIZED HEALTH CARE EDUCATION/TRAINING PROGRAM

## 2024-04-15 PROCEDURE — 36415 COLL VENOUS BLD VENIPUNCTURE: CPT

## 2024-04-15 PROCEDURE — 83036 HEMOGLOBIN GLYCOSYLATED A1C: CPT

## 2024-04-15 PROCEDURE — 3046F HEMOGLOBIN A1C LEVEL >9.0%: CPT | Performed by: STUDENT IN AN ORGANIZED HEALTH CARE EDUCATION/TRAINING PROGRAM

## 2024-04-15 PROCEDURE — 3080F DIAST BP >= 90 MM HG: CPT | Performed by: STUDENT IN AN ORGANIZED HEALTH CARE EDUCATION/TRAINING PROGRAM

## 2024-04-15 PROCEDURE — 99214 OFFICE O/P EST MOD 30 MIN: CPT | Performed by: STUDENT IN AN ORGANIZED HEALTH CARE EDUCATION/TRAINING PROGRAM

## 2024-04-15 ASSESSMENT — PAIN SCALES - GENERAL: PAINLEVEL: 0-NO PAIN

## 2024-04-15 NOTE — PROGRESS NOTES
"  Subjective   Patient ID: Yrn Rcok is a 53 y.o. male who presents for Follow-up.     1. HTN  Did see a diastolic of 107, with 160s systolic  Otherwise has not been taking measurements  Has not been exercising  Reviewed Echo - only notable for impaired pattern of LV diastolic filling    2. DM2  Home BG - has not been able to log  Hypoglycemic events - denies  Adherent to medications: metformin 1000 mg BID, jardiance 25 mg every day  We had a conversation today about insulin and that it might earnestly be time, even temporarily             Review of Systems    Objective   BP (!) 179/117   Pulse 103   Temp 35.7 °C (96.2 °F) (Tympanic)   Ht 1.727 m (5' 8\")   Wt 104 kg (229 lb 3.2 oz)   SpO2 99%   BMI 34.85 kg/m²     Physical Exam  Vitals reviewed.   Constitutional:       Appearance: He is obese.      Comments: BMI 34, class 2 obesity   HENT:      Head: Normocephalic and atraumatic.      Nose: Nose normal.      Mouth/Throat:      Mouth: Mucous membranes are moist.      Pharynx: Oropharynx is clear.   Eyes:      Extraocular Movements: Extraocular movements intact.      Conjunctiva/sclera: Conjunctivae normal.      Pupils: Pupils are equal, round, and reactive to light.   Cardiovascular:      Rate and Rhythm: Normal rate.      Pulses: Normal pulses.      Heart sounds: Normal heart sounds. No murmur heard.     No friction rub. No gallop.   Pulmonary:      Effort: Pulmonary effort is normal.      Breath sounds: Normal breath sounds.   Abdominal:      General: Abdomen is flat. Bowel sounds are normal.      Palpations: Abdomen is soft.   Musculoskeletal:      Cervical back: Normal range of motion and neck supple.   Skin:     General: Skin is warm and dry.      Capillary Refill: Capillary refill takes less than 2 seconds.   Neurological:      General: No focal deficit present.      Mental Status: He is alert.   Psychiatric:         Mood and Affect: Mood normal.         Behavior: Behavior normal. "         Assessment/Plan   Problem List Items Addressed This Visit             ICD-10-CM    Diabetes mellitus type 2 without retinopathy (Multi) - Primary E11.9    Relevant Orders    Hemoglobin A1c                  OVERALL PLAN:  [ ] repeat A1c  [ ] counseled patient on the physiology of DASH diet and exercise  [ ] patient agrees - at next visit, we will initiate a second BP agent if he does not make some kind of measureable, accountable change in diet/exercsie      -------------------------------------------------------------------------------------------------------------------    **This note was entered into the electronic medical record using Dragon medical dictation software, and was not edited thereafter. Please excuse any errors of spelling or grammar.**    -------------------------------------------------------------------------------------------------------------------

## 2024-04-17 DIAGNOSIS — E78.5 HYPERLIPIDEMIA, UNSPECIFIED HYPERLIPIDEMIA TYPE: ICD-10-CM

## 2024-04-18 RX ORDER — ATORVASTATIN CALCIUM 80 MG/1
80 TABLET, FILM COATED ORAL NIGHTLY
Qty: 90 TABLET | Refills: 1 | Status: SHIPPED | OUTPATIENT
Start: 2024-04-18

## 2024-04-30 ENCOUNTER — OFFICE VISIT (OUTPATIENT)
Dept: OPHTHALMOLOGY | Facility: CLINIC | Age: 54
End: 2024-04-30
Payer: COMMERCIAL

## 2024-04-30 DIAGNOSIS — E11.3313 MODERATE NONPROLIFERATIVE DIABETIC RETINOPATHY OF BOTH EYES WITH MACULAR EDEMA ASSOCIATED WITH TYPE 2 DIABETES MELLITUS (MULTI): Primary | ICD-10-CM

## 2024-04-30 PROCEDURE — 92134 CPTRZ OPH DX IMG PST SGM RTA: CPT | Performed by: OPHTHALMOLOGY

## 2024-04-30 PROCEDURE — 99213 OFFICE O/P EST LOW 20 MIN: CPT | Performed by: OPHTHALMOLOGY

## 2024-04-30 ASSESSMENT — CONF VISUAL FIELD
OS_INFERIOR_NASAL_RESTRICTION: 0
OD_SUPERIOR_NASAL_RESTRICTION: 0
OS_INFERIOR_TEMPORAL_RESTRICTION: 0
OS_SUPERIOR_TEMPORAL_RESTRICTION: 0
OS_NORMAL: 1
OD_INFERIOR_TEMPORAL_RESTRICTION: 0
OD_INFERIOR_NASAL_RESTRICTION: 0
OD_SUPERIOR_TEMPORAL_RESTRICTION: 0
OS_SUPERIOR_NASAL_RESTRICTION: 0
OD_NORMAL: 1

## 2024-04-30 ASSESSMENT — VISUAL ACUITY
METHOD: SNELLEN - LINEAR
OD_SC: 20/30
OS_SC: 20/40

## 2024-04-30 ASSESSMENT — TONOMETRY
OD_IOP_MMHG: 16
OS_IOP_MMHG: 16
IOP_METHOD: GOLDMANN APPLANATION

## 2024-04-30 ASSESSMENT — ENCOUNTER SYMPTOMS: EYES NEGATIVE: 1

## 2024-04-30 NOTE — PROGRESS NOTES
Assessment/Plan   Diagnoses and all orders for this visit:  Combined forms of age-related cataract of both eyes  - Saw Dr Stone, will not proceed with CEIOL until retina stabilized.   Diabetic macular edema of both eyes with retinopathy associated with type 2 diabetes mellitus (CMS/HCC)  Moderate nonproliferative diabetic retinopathy of right eye with macular edema associated with type 2 diabetes mellitus (CMS/HCC)  Stable proliferative diabetic retinopathy of left eye associated with type 2 diabetes mellitus (CMS/HCC)      - A1c 10.0 10/10/23  - Likely neovascularization of the disc (NVD) in both eyes   - will plan for fluorescein angiography (FA) at next visit (left eye transit)  - follow up in 2-3 weeks with fluorescein angiography (FA) and possible treatment Ou. Eylea and avastin approved.        Mac OCT 01/09/24  Od: macular edema but preserved foveal contour.   Os: macular edema with distorted foveal contour.      Fluorescein angiography (FA) does not appear diabetic no neovascularization of the disc (NVD) OU  Hypertensive etiology vs uveitis  Will send for uveitis panel  Return to clinic 1 month    DIAGNOSTIC PROCEDURE DONE    OCT DONE OD/OS            REASON FOR TEST: will help address and tailor  therapy by detecting subclinical CME SRF     Hi quality OCT  scans obtained  signal good    OCT OD - Normal Foveal Contour, Edema, IS/OS Junction Normal  OCT OS - Normal Foveal Contour,  Edema, IS/OS Junction Normal    additional commnents:    Patient doesn't have , RTC 2 weeks for eylea injection OU

## 2024-05-01 PROBLEM — L03.039 CELLULITIS OF TOE: Status: ACTIVE | Noted: 2023-04-26

## 2024-05-01 PROBLEM — G82.20 PARAPARESIS (MULTI): Status: ACTIVE | Noted: 2023-05-04

## 2024-05-01 PROBLEM — M79.673 PAIN OF FOOT: Status: ACTIVE | Noted: 2024-05-01

## 2024-05-01 PROBLEM — R73.9 HYPERGLYCEMIA: Status: ACTIVE | Noted: 2024-05-01

## 2024-05-01 PROBLEM — A64 SEXUALLY TRANSMITTED DISEASE: Status: ACTIVE | Noted: 2024-05-01

## 2024-05-01 PROBLEM — R05.3 CHRONIC COUGH: Status: ACTIVE | Noted: 2024-05-01

## 2024-05-01 PROBLEM — R60.9 2+ PITTING EDEMA: Status: ACTIVE | Noted: 2024-05-01

## 2024-05-01 PROBLEM — L30.5 PITYRIASIS SIMPLEX: Status: ACTIVE | Noted: 2024-05-01

## 2024-05-01 PROBLEM — B35.1 ONYCHOMYCOSIS: Status: ACTIVE | Noted: 2024-05-01

## 2024-05-01 PROBLEM — L98.9 SKIN LESION: Status: ACTIVE | Noted: 2024-05-01

## 2024-05-01 PROBLEM — M25.511 PAIN OF RIGHT SHOULDER REGION: Status: ACTIVE | Noted: 2024-05-01

## 2024-05-01 PROBLEM — M75.20 BICEPS TENDINITIS: Status: ACTIVE | Noted: 2024-05-01

## 2024-05-01 PROBLEM — H35.63 RETINAL HEMORRHAGE OF BOTH EYES: Status: ACTIVE | Noted: 2024-01-25

## 2024-05-01 PROBLEM — L30.9 ACUTE ECZEMA: Status: ACTIVE | Noted: 2024-05-01

## 2024-05-02 DIAGNOSIS — E11.42 TYPE 2 DIABETES MELLITUS WITH DIABETIC POLYNEUROPATHY, WITHOUT LONG-TERM CURRENT USE OF INSULIN (MULTI): ICD-10-CM

## 2024-05-06 DIAGNOSIS — E11.42 TYPE 2 DIABETES MELLITUS WITH DIABETIC POLYNEUROPATHY, WITHOUT LONG-TERM CURRENT USE OF INSULIN (MULTI): ICD-10-CM

## 2024-05-06 NOTE — TELEPHONE ENCOUNTER
Pt called requesting Jardiance refills to be resent to pharmacy as 90 day supply dispenses so that it can be covered via pt insurance. Order pended and routed to provider.

## 2024-05-16 ENCOUNTER — OFFICE VISIT (OUTPATIENT)
Dept: OPHTHALMOLOGY | Facility: CLINIC | Age: 54
End: 2024-05-16
Payer: COMMERCIAL

## 2024-05-16 DIAGNOSIS — E11.3213: ICD-10-CM

## 2024-05-16 DIAGNOSIS — E11.311: Primary | ICD-10-CM

## 2024-05-16 PROCEDURE — 92134 CPTRZ OPH DX IMG PST SGM RTA: CPT | Mod: BILATERAL PROCEDURE | Performed by: OPHTHALMOLOGY

## 2024-05-16 PROCEDURE — 99214 OFFICE O/P EST MOD 30 MIN: CPT | Performed by: OPHTHALMOLOGY

## 2024-05-16 PROCEDURE — 67028 INJECTION EYE DRUG: CPT | Mod: BILATERAL PROCEDURE | Performed by: OPHTHALMOLOGY

## 2024-05-16 ASSESSMENT — CONF VISUAL FIELD
OD_NORMAL: 1
OS_SUPERIOR_NASAL_RESTRICTION: 0
OS_INFERIOR_TEMPORAL_RESTRICTION: 0
OS_SUPERIOR_TEMPORAL_RESTRICTION: 0
OD_SUPERIOR_TEMPORAL_RESTRICTION: 0
OD_INFERIOR_NASAL_RESTRICTION: 0
OS_NORMAL: 1
OS_INFERIOR_NASAL_RESTRICTION: 0
OD_INFERIOR_TEMPORAL_RESTRICTION: 0
OD_SUPERIOR_NASAL_RESTRICTION: 0

## 2024-05-16 ASSESSMENT — ENCOUNTER SYMPTOMS
ALLERGIC/IMMUNOLOGIC NEGATIVE: 0
CARDIOVASCULAR NEGATIVE: 0
NEUROLOGICAL NEGATIVE: 0
ENDOCRINE NEGATIVE: 0
EYES NEGATIVE: 0
PSYCHIATRIC NEGATIVE: 0
CONSTITUTIONAL NEGATIVE: 0
GASTROINTESTINAL NEGATIVE: 0
HEMATOLOGIC/LYMPHATIC NEGATIVE: 0
RESPIRATORY NEGATIVE: 0
MUSCULOSKELETAL NEGATIVE: 0

## 2024-05-16 ASSESSMENT — VISUAL ACUITY
OD_SC: 20/40
OS_SC: 20/50
OD_SC+: +1
OS_SC+: +2
METHOD: SNELLEN - LINEAR

## 2024-05-16 ASSESSMENT — CUP TO DISC RATIO
OD_RATIO: 0.3
OS_RATIO: 0.3

## 2024-05-16 ASSESSMENT — SLIT LAMP EXAM - LIDS
COMMENTS: NORMAL
COMMENTS: NORMAL

## 2024-05-16 ASSESSMENT — EXTERNAL EXAM - LEFT EYE: OS_EXAM: NORMAL

## 2024-05-16 ASSESSMENT — TONOMETRY
OS_IOP_MMHG: 12
OD_IOP_MMHG: 14
IOP_METHOD: TONOPEN

## 2024-05-16 ASSESSMENT — EXTERNAL EXAM - RIGHT EYE: OD_EXAM: NORMAL

## 2024-05-16 NOTE — PROGRESS NOTES
Assessment/Plan   Diagnoses and all orders for this visit:  Combined forms of age-related cataract of both eyes  - Saw Dr Stone, will not proceed with CEIOL until retina stabilized.   Diabetic macular edema of both eyes with retinopathy associated with type 2 diabetes mellitus (CMS/HCC)  Moderate nonproliferative diabetic retinopathy of right eye with macular edema associated with type 2 diabetes mellitus (CMS/HCC)  Stable proliferative diabetic retinopathy of left eye associated with type 2 diabetes mellitus (CMS/HCC)      - A1c 10.0 10/10/23  - Likely neovascularization of the disc (NVD) in both eyes   - will plan for fluorescein angiography (FA) at next visit (left eye transit)  - follow up in 2-3 weeks with fluorescein angiography (FA) and possible treatment Ou. Eylea and avastin approved.        Mac OCT 01/09/24  Od: macular edema but preserved foveal contour.   Os: macular edema with distorted foveal contour.    Fluorescein angiography (FA) does not appear diabetic no neovascularization of the disc (NVD) OU  Hypertensive etiology vs uveitis  Will send for uveitis panel  Return to clinic 1 month    DIAGNOSTIC PROCEDURE DONE    OCT DONE OD/OS    REASON FOR TEST: will help address and tailor  therapy by detecting subclinical CME SRF   Hi quality OCT  scans obtained  signal good  OCT OD - Normal Foveal Contour, Edema, IS/OS Junction Normal  OCT OS - Normal Foveal Contour,  Edema, IS/OS Junction Normal    additional commnents:  Here for Eylea injection both eyes   Admistered without complication  2 mo follow up

## 2024-07-18 ENCOUNTER — APPOINTMENT (OUTPATIENT)
Dept: OPHTHALMOLOGY | Facility: CLINIC | Age: 54
End: 2024-07-18
Payer: COMMERCIAL

## 2024-07-18 DIAGNOSIS — E11.311: Primary | ICD-10-CM

## 2024-07-18 DIAGNOSIS — E11.3413 DIABETIC VISUAL LOSS: SEVERE VISION IMPAIRMENT OF BOTH EYES, WITH MACULAR EDEMA, WITH SEVERE NONPROLIFERATIVE RETINOPATHY, ASSOCIATED WITH TYPE 2 DIABETES MELLITUS (MULTI): ICD-10-CM

## 2024-07-18 DIAGNOSIS — H54.2X22 DIABETIC VISUAL LOSS: SEVERE VISION IMPAIRMENT OF BOTH EYES, WITH MACULAR EDEMA, WITH SEVERE NONPROLIFERATIVE RETINOPATHY, ASSOCIATED WITH TYPE 2 DIABETES MELLITUS (MULTI): ICD-10-CM

## 2024-07-18 PROCEDURE — 92134 CPTRZ OPH DX IMG PST SGM RTA: CPT | Performed by: OPHTHALMOLOGY

## 2024-07-18 PROCEDURE — 67028 INJECTION EYE DRUG: CPT | Mod: BILATERAL PROCEDURE | Performed by: OPHTHALMOLOGY

## 2024-07-18 ASSESSMENT — SLIT LAMP EXAM - LIDS
COMMENTS: NORMAL
COMMENTS: NORMAL

## 2024-07-18 ASSESSMENT — CUP TO DISC RATIO
OD_RATIO: 0.3
OS_RATIO: 0.3

## 2024-07-18 ASSESSMENT — EXTERNAL EXAM - RIGHT EYE: OD_EXAM: NORMAL

## 2024-07-18 ASSESSMENT — VISUAL ACUITY
METHOD: SNELLEN - LINEAR
OD_SC: 20/50-1
OS_SC: 20/50-1

## 2024-07-18 ASSESSMENT — TONOMETRY
IOP_METHOD: GOLDMANN APPLANATION
OS_IOP_MMHG: 13
OD_IOP_MMHG: 14

## 2024-07-18 ASSESSMENT — EXTERNAL EXAM - LEFT EYE: OS_EXAM: NORMAL

## 2024-07-18 NOTE — PROGRESS NOTES
Assessment/Plan   Diagnoses and all orders for this visit:  Combined forms of age-related cataract of both eyes  - Saw Dr Stone, will not proceed with CEIOL until retina stabilized.   Diabetic macular edema of both eyes with retinopathy associated with type 2 diabetes mellitus (CMS/HCC)  Moderate nonproliferative diabetic retinopathy of right eye with macular edema associated with type 2 diabetes mellitus (CMS/HCC)  Stable proliferative diabetic retinopathy of left eye associated with type 2 diabetes mellitus (CMS/HCC)      - A1c 10.0 10/10/23     Mac OCT 01/09/24  Od: macular edema but preserved foveal contour.   Os: macular edema with distorted foveal contour.    Fluorescein angiography (FA) does not appear diabetic no neovascularization of the disc (NVD) OU  Hypertensive etiology vs uveitis  Will send for uveitis panel  Return to clinic 1 month    DIAGNOSTIC PROCEDURE DONE    OCT DONE OD/OS    REASON FOR TEST: will help address and tailor  therapy by detecting subclinical CME SRF   Hi quality OCT  scans obtained  signal good  OCT OD - Normal Foveal Contour, Edema, IS/OS Junction Normal  OCT OS - Normal Foveal Contour,  Edema, IS/OS Junction Normal    additional commnents:  Here for Eylea injection both eyes   Admistered without complication       Treatment options for DME OU discussed, including observation, anti-VEGF injections (including Avastin, Lucentis, Beovu, Vabysmo and Eylea) and laser. Recommend anti-VEGF injections. Eylea done OUtoday in a sterile manner with Betadine 5% for antisepsis.

## 2024-09-19 ENCOUNTER — APPOINTMENT (OUTPATIENT)
Dept: OPHTHALMOLOGY | Facility: CLINIC | Age: 54
End: 2024-09-19
Payer: COMMERCIAL

## 2024-09-19 DIAGNOSIS — E11.3313 MODERATE NONPROLIFERATIVE DIABETIC RETINOPATHY OF BOTH EYES WITH MACULAR EDEMA ASSOCIATED WITH TYPE 2 DIABETES MELLITUS: ICD-10-CM

## 2024-09-19 DIAGNOSIS — E11.311: Primary | ICD-10-CM

## 2024-09-19 PROCEDURE — 67028 INJECTION EYE DRUG: CPT | Mod: BILATERAL PROCEDURE | Performed by: OPHTHALMOLOGY

## 2024-09-19 PROCEDURE — 92134 CPTRZ OPH DX IMG PST SGM RTA: CPT | Performed by: OPHTHALMOLOGY

## 2024-09-19 ASSESSMENT — EXTERNAL EXAM - LEFT EYE: OS_EXAM: NORMAL

## 2024-09-19 ASSESSMENT — VISUAL ACUITY
METHOD: SNELLEN - LINEAR
OS_SC: 20/50-2
OD_SC: 20/40-1
OS_PH_SC: 20/40-2
OD_PH_SC: 20/30+2

## 2024-09-19 ASSESSMENT — EXTERNAL EXAM - RIGHT EYE: OD_EXAM: NORMAL

## 2024-09-19 ASSESSMENT — TONOMETRY
OS_IOP_MMHG: 14
IOP_METHOD: GOLDMANN APPLANATION
OD_IOP_MMHG: 14

## 2024-09-19 ASSESSMENT — CUP TO DISC RATIO
OD_RATIO: 0.3
OS_RATIO: 0.3

## 2024-09-19 ASSESSMENT — SLIT LAMP EXAM - LIDS
COMMENTS: NORMAL
COMMENTS: NORMAL

## 2024-09-19 NOTE — PROGRESS NOTES
Assessment/Plan   Diagnoses and all orders for this visit:  Diabetic macular edema of both eyes with retinopathy associated with type 2 diabetes mellitus (Multi)  -     OCT, Retina - OU - Both Eyes  Moderate nonproliferative diabetic retinopathy of both eyes with macular edema associated with type 2 diabetes mellitus (Multi)  -     OCT, Retina - OU - Both Eyes      DIAGNOSTIC PROCEDURE DONE    OCT DONE OD/OS            REASON FOR TEST: will help address and tailor  therapy by detecting subclinical CME SRF     Hi quality OCT  scans obtained  signal good    OCT OD - Normal Foveal Contour, Edema, IS/OS Junction Normal  OCT OS - Normal Foveal Contour,   Edema, IS/OS Junction Normal    additional commnents:    TREAT dme OU  Treatment options for DME OU discussed, including observation, anti-VEGF injections (including Avastin, Lucentis, Beovu, Vabysmo and Eylea) and laser. Recommend anti-VEGF injections. Eylea done OUtoday in a sterile manner with Betadine 5% for antisepsis.   6w

## 2024-10-29 ENCOUNTER — APPOINTMENT (OUTPATIENT)
Dept: OPHTHALMOLOGY | Facility: CLINIC | Age: 54
End: 2024-10-29
Payer: COMMERCIAL

## 2024-10-29 DIAGNOSIS — H52.4 MYOPIA OF LEFT EYE WITH ASTIGMATISM AND PRESBYOPIA: ICD-10-CM

## 2024-10-29 DIAGNOSIS — H52.12 MYOPIA OF LEFT EYE WITH ASTIGMATISM AND PRESBYOPIA: ICD-10-CM

## 2024-10-29 DIAGNOSIS — E11.311: ICD-10-CM

## 2024-10-29 DIAGNOSIS — E11.3313 MODERATE NONPROLIFERATIVE DIABETIC RETINOPATHY OF BOTH EYES WITH MACULAR EDEMA ASSOCIATED WITH TYPE 2 DIABETES MELLITUS: Primary | ICD-10-CM

## 2024-10-29 DIAGNOSIS — H52.202 MYOPIA OF LEFT EYE WITH ASTIGMATISM AND PRESBYOPIA: ICD-10-CM

## 2024-10-29 PROCEDURE — 67028 INJECTION EYE DRUG: CPT | Mod: BILATERAL PROCEDURE | Performed by: OPHTHALMOLOGY

## 2024-10-29 PROCEDURE — 92134 CPTRZ OPH DX IMG PST SGM RTA: CPT | Performed by: OPHTHALMOLOGY

## 2024-10-29 ASSESSMENT — CONF VISUAL FIELD
OS_SUPERIOR_NASAL_RESTRICTION: 0
OD_SUPERIOR_TEMPORAL_RESTRICTION: 0
OD_INFERIOR_TEMPORAL_RESTRICTION: 0
OD_INFERIOR_NASAL_RESTRICTION: 0
OS_NORMAL: 1
OS_INFERIOR_TEMPORAL_RESTRICTION: 0
OS_SUPERIOR_TEMPORAL_RESTRICTION: 0
OS_INFERIOR_NASAL_RESTRICTION: 0
OD_SUPERIOR_NASAL_RESTRICTION: 0
OD_NORMAL: 1

## 2024-10-29 ASSESSMENT — ENCOUNTER SYMPTOMS
ENDOCRINE NEGATIVE: 0
ALLERGIC/IMMUNOLOGIC NEGATIVE: 0
NEUROLOGICAL NEGATIVE: 0
PSYCHIATRIC NEGATIVE: 0
CONSTITUTIONAL NEGATIVE: 0
HEMATOLOGIC/LYMPHATIC NEGATIVE: 0
MUSCULOSKELETAL NEGATIVE: 0
GASTROINTESTINAL NEGATIVE: 0
CARDIOVASCULAR NEGATIVE: 0
RESPIRATORY NEGATIVE: 0
EYES NEGATIVE: 1

## 2024-10-29 ASSESSMENT — EXTERNAL EXAM - LEFT EYE: OS_EXAM: NORMAL

## 2024-10-29 ASSESSMENT — SLIT LAMP EXAM - LIDS
COMMENTS: NORMAL
COMMENTS: NORMAL

## 2024-10-29 ASSESSMENT — CUP TO DISC RATIO
OS_RATIO: 0.3
OD_RATIO: 0.3

## 2024-10-29 ASSESSMENT — TONOMETRY
OD_IOP_MMHG: 15
IOP_METHOD: TONOPEN
OS_IOP_MMHG: 16

## 2024-10-29 ASSESSMENT — VISUAL ACUITY
METHOD: SNELLEN - LINEAR
OS_SC: 20/40
OS_SC+: +1
OD_SC: 20/40
OD_SC+: -1

## 2024-10-29 ASSESSMENT — EXTERNAL EXAM - RIGHT EYE: OD_EXAM: NORMAL

## 2024-11-29 ENCOUNTER — APPOINTMENT (OUTPATIENT)
Dept: OPHTHALMOLOGY | Facility: CLINIC | Age: 54
End: 2024-11-29
Payer: COMMERCIAL

## 2024-12-13 ENCOUNTER — APPOINTMENT (OUTPATIENT)
Dept: OPHTHALMOLOGY | Facility: CLINIC | Age: 54
End: 2024-12-13
Payer: COMMERCIAL

## 2025-01-24 ENCOUNTER — LAB (OUTPATIENT)
Dept: LAB | Facility: LAB | Age: 55
End: 2025-01-24
Payer: COMMERCIAL

## 2025-01-24 ENCOUNTER — APPOINTMENT (OUTPATIENT)
Dept: PRIMARY CARE | Facility: CLINIC | Age: 55
End: 2025-01-24
Payer: COMMERCIAL

## 2025-01-24 VITALS
BODY MASS INDEX: 36.68 KG/M2 | OXYGEN SATURATION: 95 % | SYSTOLIC BLOOD PRESSURE: 216 MMHG | TEMPERATURE: 97.9 F | HEART RATE: 108 BPM | HEIGHT: 68 IN | DIASTOLIC BLOOD PRESSURE: 119 MMHG | WEIGHT: 242 LBS

## 2025-01-24 DIAGNOSIS — F52.21 MALE ERECTILE DISORDER (CODE): Primary | ICD-10-CM

## 2025-01-24 DIAGNOSIS — I10 PRIMARY HYPERTENSION: ICD-10-CM

## 2025-01-24 DIAGNOSIS — E11.42 TYPE 2 DIABETES MELLITUS WITH DIABETIC POLYNEUROPATHY, WITHOUT LONG-TERM CURRENT USE OF INSULIN: ICD-10-CM

## 2025-01-24 DIAGNOSIS — F52.21 MALE ERECTILE DISORDER (CODE): ICD-10-CM

## 2025-01-24 PROCEDURE — 99214 OFFICE O/P EST MOD 30 MIN: CPT | Performed by: STUDENT IN AN ORGANIZED HEALTH CARE EDUCATION/TRAINING PROGRAM

## 2025-01-24 PROCEDURE — 84402 ASSAY OF FREE TESTOSTERONE: CPT

## 2025-01-24 PROCEDURE — 80053 COMPREHEN METABOLIC PANEL: CPT

## 2025-01-24 PROCEDURE — 36415 COLL VENOUS BLD VENIPUNCTURE: CPT

## 2025-01-24 PROCEDURE — 3077F SYST BP >= 140 MM HG: CPT | Performed by: STUDENT IN AN ORGANIZED HEALTH CARE EDUCATION/TRAINING PROGRAM

## 2025-01-24 PROCEDURE — 3052F HG A1C>EQUAL 8.0%<EQUAL 9.0%: CPT | Performed by: STUDENT IN AN ORGANIZED HEALTH CARE EDUCATION/TRAINING PROGRAM

## 2025-01-24 PROCEDURE — 4010F ACE/ARB THERAPY RXD/TAKEN: CPT | Performed by: STUDENT IN AN ORGANIZED HEALTH CARE EDUCATION/TRAINING PROGRAM

## 2025-01-24 PROCEDURE — 82570 ASSAY OF URINE CREATININE: CPT

## 2025-01-24 PROCEDURE — 3062F POS MACROALBUMINURIA REV: CPT | Performed by: STUDENT IN AN ORGANIZED HEALTH CARE EDUCATION/TRAINING PROGRAM

## 2025-01-24 PROCEDURE — 83036 HEMOGLOBIN GLYCOSYLATED A1C: CPT

## 2025-01-24 PROCEDURE — 82043 UR ALBUMIN QUANTITATIVE: CPT

## 2025-01-24 PROCEDURE — 3008F BODY MASS INDEX DOCD: CPT | Performed by: STUDENT IN AN ORGANIZED HEALTH CARE EDUCATION/TRAINING PROGRAM

## 2025-01-24 PROCEDURE — 3080F DIAST BP >= 90 MM HG: CPT | Performed by: STUDENT IN AN ORGANIZED HEALTH CARE EDUCATION/TRAINING PROGRAM

## 2025-01-24 RX ORDER — OLMESARTAN MEDOXOMIL 40 MG/1
40 TABLET ORAL DAILY
Qty: 30 TABLET | Refills: 11 | Status: SHIPPED | OUTPATIENT
Start: 2025-01-24 | End: 2026-01-19

## 2025-01-24 RX ORDER — METFORMIN HYDROCHLORIDE 500 MG/1
1000 TABLET, EXTENDED RELEASE ORAL
Qty: 120 TABLET | Refills: 5 | Status: SHIPPED | OUTPATIENT
Start: 2025-01-24 | End: 2025-07-23

## 2025-01-24 ASSESSMENT — PAIN SCALES - GENERAL: PAINLEVEL_OUTOF10: 0-NO PAIN

## 2025-01-24 NOTE — PROGRESS NOTES
"  Subjective   Patient ID: Yrn Rock is a 54 y.o. male who presents for Follow-up.     Interval changes:  ***  Looser stools since statin started    1. Loose stools  First noted after starting stain medicaiton  Then stopped the statin, and saw a difference in stooling  Even now without the stain             Review of Systems   All other systems reviewed and are negative.      Objective   BP (!) 216/119 (BP Location: Right arm, Patient Position: Sitting, BP Cuff Size: Adult)   Pulse 108   Temp 36.6 °C (97.9 °F) (Temporal)   Ht 1.727 m (5' 8\")   Wt 110 kg (242 lb)   SpO2 95%   BMI 36.80 kg/m²     Physical Exam  Constitutional:       General: He is not in acute distress.     Appearance: He is obese.   HENT:      Head: Normocephalic and atraumatic.      Nose: Nose normal.      Mouth/Throat:      Mouth: Mucous membranes are moist.      Pharynx: Oropharynx is clear.   Eyes:      Extraocular Movements: Extraocular movements intact.      Conjunctiva/sclera: Conjunctivae normal.      Pupils: Pupils are equal, round, and reactive to light.   Cardiovascular:      Rate and Rhythm: Normal rate.      Pulses: Normal pulses.      Heart sounds: Normal heart sounds. No murmur heard.     No friction rub. No gallop.   Pulmonary:      Effort: Pulmonary effort is normal.      Breath sounds: Normal breath sounds.   Abdominal:      General: Abdomen is flat. Bowel sounds are normal.      Palpations: Abdomen is soft.   Musculoskeletal:      Cervical back: Normal range of motion and neck supple.   Skin:     General: Skin is warm and dry.      Capillary Refill: Capillary refill takes less than 2 seconds.   Neurological:      General: No focal deficit present.      Mental Status: He is alert.   Psychiatric:         Mood and Affect: Mood normal.         Behavior: Behavior normal.         Assessment/Plan   Problem List Items Addressed This Visit             ICD-10-CM    Hypertension I10    Relevant Medications    olmesartan (BENIcar) 40 " mg tablet    Other Relevant Orders    Comprehensive metabolic panel    CBC     Other Visit Diagnoses         Codes    Male erectile disorder (CODE)    -  Primary F52.21    Relevant Orders    Comprehensive metabolic panel    CBC    Testosterone, total and free    Type 2 diabetes mellitus with diabetic polyneuropathy, without long-term current use of insulin     E11.42    Relevant Medications    metFORMIN XR (Glucophage-XR) 500 mg 24 hr tablet    empagliflozin (Jardiance) 25 mg    Other Relevant Orders    Hemoglobin A1c    Comprehensive metabolic panel    Albumin-Creatinine Ratio, Urine Random    CBC                       OVERALL PLAN:  [ ] OK to hold statin for now, patient to trial elimination diet[ ] Refill of benicar 40mg. Patient given instructions for BP goal; will message me numbers. Nurse visit in 2 weeks for BP check. FUV 1-2 months with me or Dr. Mahmood to titrate as needed.  [ ] Awaiting better BP numbers to consider ED medications.  [ ] CMP, A1c, urine albumin, CBC, lipids for DM2  [ ] Refilled Metformin 1000 mg BID  [ ] Tesosterone levels    Note: This documentaion was entered into the electronic medical record using [employer] medical dictation software, and was not necessarily edited thereafter. Please excuse any errors of spelling or grammar.     electronic medical record using [employer] medical dictation software, and was not necessarily edited thereafter. Please excuse any errors of spelling or grammar.

## 2025-01-24 NOTE — PATIENT INSTRUCTIONS
Goal blood is 140/90 or less.    Measure each day around 1:00-2:00 PM.    Report back to Dr. Ramos if systolic (top number) >200, or diastolic (bottom number) >120 consistently after 4-5 days of benicar.

## 2025-01-27 ENCOUNTER — TELEPHONE (OUTPATIENT)
Facility: HOSPITAL | Age: 55
End: 2025-01-27
Payer: COMMERCIAL

## 2025-01-27 LAB
ALBUMIN SERPL BCP-MCNC: 4 G/DL (ref 3.4–5)
ALP SERPL-CCNC: 65 U/L (ref 33–120)
ALT SERPL W P-5'-P-CCNC: 26 U/L (ref 10–52)
ANION GAP SERPL CALC-SCNC: 13 MMOL/L (ref 10–20)
AST SERPL W P-5'-P-CCNC: 19 U/L (ref 9–39)
BILIRUB SERPL-MCNC: 0.5 MG/DL (ref 0–1.2)
BUN SERPL-MCNC: 16 MG/DL (ref 6–23)
CALCIUM SERPL-MCNC: 9.9 MG/DL (ref 8.6–10.6)
CHLORIDE SERPL-SCNC: 102 MMOL/L (ref 98–107)
CO2 SERPL-SCNC: 31 MMOL/L (ref 21–32)
CREAT SERPL-MCNC: 1.13 MG/DL (ref 0.5–1.3)
CREAT UR-MCNC: 37.3 MG/DL (ref 20–370)
EGFRCR SERPLBLD CKD-EPI 2021: 76 ML/MIN/1.73M*2
EST. AVERAGE GLUCOSE BLD GHB EST-MCNC: 206 MG/DL
GLUCOSE SERPL-MCNC: 139 MG/DL (ref 74–99)
HBA1C MFR BLD: 8.8 %
MICROALBUMIN UR-MCNC: 1806.2 MG/L
MICROALBUMIN/CREAT UR: 4842.4 UG/MG CREAT
POTASSIUM SERPL-SCNC: 4 MMOL/L (ref 3.5–5.3)
PROT SERPL-MCNC: 6.9 G/DL (ref 6.4–8.2)
SODIUM SERPL-SCNC: 142 MMOL/L (ref 136–145)

## 2025-02-10 DIAGNOSIS — I10 PRIMARY HYPERTENSION: Primary | ICD-10-CM

## 2025-02-10 RX ORDER — AMLODIPINE BESYLATE 10 MG/1
5 TABLET ORAL DAILY
Qty: 15 TABLET | Refills: 11 | Status: SHIPPED | OUTPATIENT
Start: 2025-02-10 | End: 2026-02-10

## 2025-02-10 NOTE — PROGRESS NOTES
Patient sends me message with the following ambulatory BP readings:    1-27.    225/138  1-30.    204/123  1-31     201/125  2/4.     199/122  2/6.     192/118    Single agent, benicar. High pressures in previous visits. Patient has been meaning to exercise consistently, but has not materialized.    NKA.    Discussed with patient.    Plan:  [ ] c/w benicar 40 mg (max dose for this ARB)  [ ] addition of amlodipine 10 mg

## 2025-02-11 ENCOUNTER — CLINICAL SUPPORT (OUTPATIENT)
Facility: HOSPITAL | Age: 55
End: 2025-02-11
Payer: COMMERCIAL

## 2025-02-11 VITALS — SYSTOLIC BLOOD PRESSURE: 208 MMHG | DIASTOLIC BLOOD PRESSURE: 116 MMHG

## 2025-02-11 NOTE — PROGRESS NOTES
Mr. Rock presented for a BP check.  Had not yet started Amlodipine that was ordered yesterday by Dr. Ramos.  Denies chest pain, palpitations, HA, acute vision changes, new weakness, confusion. He appears quite comfortable.  Advised that he start Amlodipine ASAP today and that he call 911 stat prn chest pain, palpitations, HA, acute vision change, weakness, confusion.  He will f/u on 2/17 for BP check.

## 2025-02-11 NOTE — PROGRESS NOTES
Nurse made aware pt BP hypertensive taken automatically. Allowed pt to sit/relax 20+ minutes prior to obtaining manually. BP obtained manually in L arm 218/122. Retaken in R arm manually, 208/116. Pt currently compliant with BP therapy. Informed nurse provider made aware of similar BPs at home, on Friday, and provider added new med yesterday which pt has not been able to start yet.   This nurse spoke with attending r/t pt current Bps and situation. Attending in to speak with pt. Nurse made aware pt will schedule 1 week nurse visit r/t BP check.

## 2025-02-14 ENCOUNTER — APPOINTMENT (OUTPATIENT)
Dept: OPHTHALMOLOGY | Facility: CLINIC | Age: 55
End: 2025-02-14
Payer: COMMERCIAL

## 2025-02-17 ENCOUNTER — APPOINTMENT (OUTPATIENT)
Facility: HOSPITAL | Age: 55
End: 2025-02-17
Payer: COMMERCIAL

## 2025-02-18 ENCOUNTER — APPOINTMENT (OUTPATIENT)
Facility: HOSPITAL | Age: 55
End: 2025-02-18
Payer: COMMERCIAL

## 2025-02-20 ENCOUNTER — APPOINTMENT (OUTPATIENT)
Dept: OPHTHALMOLOGY | Facility: CLINIC | Age: 55
End: 2025-02-20
Payer: COMMERCIAL

## 2025-02-20 DIAGNOSIS — E11.3313 MODERATE NONPROLIFERATIVE DIABETIC RETINOPATHY OF BOTH EYES WITH MACULAR EDEMA ASSOCIATED WITH TYPE 2 DIABETES MELLITUS: Primary | ICD-10-CM

## 2025-02-20 DIAGNOSIS — H35.63 RETINAL HEMORRHAGE OF BOTH EYES: ICD-10-CM

## 2025-02-20 DIAGNOSIS — E11.3552 STABLE PROLIFERATIVE DIABETIC RETINOPATHY OF LEFT EYE ASSOCIATED WITH TYPE 2 DIABETES MELLITUS (MULTI): ICD-10-CM

## 2025-02-20 PROCEDURE — 92134 CPTRZ OPH DX IMG PST SGM RTA: CPT | Performed by: OPHTHALMOLOGY

## 2025-02-20 PROCEDURE — 67028 INJECTION EYE DRUG: CPT | Mod: BILATERAL PROCEDURE | Performed by: OPHTHALMOLOGY

## 2025-02-20 ASSESSMENT — CONF VISUAL FIELD
OS_SUPERIOR_NASAL_RESTRICTION: 0
OD_NORMAL: 1
OD_INFERIOR_NASAL_RESTRICTION: 0
OS_INFERIOR_NASAL_RESTRICTION: 0
OD_SUPERIOR_TEMPORAL_RESTRICTION: 0
OS_INFERIOR_TEMPORAL_RESTRICTION: 0
OS_NORMAL: 1
OD_INFERIOR_TEMPORAL_RESTRICTION: 0
OD_SUPERIOR_NASAL_RESTRICTION: 0
OS_SUPERIOR_TEMPORAL_RESTRICTION: 0

## 2025-02-20 ASSESSMENT — VISUAL ACUITY
OS_SC: 20/40-2
METHOD: SNELLEN - LINEAR
OD_SC: 20/40

## 2025-02-20 ASSESSMENT — TONOMETRY
OS_IOP_MMHG: 16
IOP_METHOD: GOLDMANN APPLANATION
OD_IOP_MMHG: 16

## 2025-02-20 ASSESSMENT — ENCOUNTER SYMPTOMS: EYES NEGATIVE: 1

## 2025-02-20 NOTE — PROGRESS NOTES
Assessment/Plan   Diagnoses and all orders for this visit:  Moderate nonproliferative diabetic retinopathy of both eyes with macular edema associated with type 2 diabetes mellitus  -     OCT, Retina - OU - Both Eyes      DIAGNOSTIC PROCEDURE DONE    OCT DONE OD/OS       REASON FOR TEST: will help address and tailor  therapy by detecting subclinical CME SRF     Hi quality OCT  scans obtained  signal good    OCT OD - Normal Foveal Contour, Edema, IS/OS Junction Normal  OCT OS - Normal Foveal Contour,   Edema, IS/OS Junction Normal    additional commnents:    Sp JEFF OU 1/25/24, 2/28/24, 5/16/24, 7/18/24, 9/19/24, 10/29/24    TREAT dme OU  Treatment options for DME OU discussed, including observation, anti-VEGF injections (including Avastin, Lucentis, Beovu, Vabysmo and Eylea) and laser. Recommend anti-VEGF injections. Eylea done OU today in a sterile manner with Betadine 5% for antisepsis.     RTC 6w

## 2025-02-24 ENCOUNTER — CLINICAL SUPPORT (OUTPATIENT)
Facility: HOSPITAL | Age: 55
End: 2025-02-24
Payer: COMMERCIAL

## 2025-02-24 VITALS — SYSTOLIC BLOOD PRESSURE: 168 MMHG | HEART RATE: 98 BPM | OXYGEN SATURATION: 97 % | DIASTOLIC BLOOD PRESSURE: 102 MMHG

## 2025-02-24 DIAGNOSIS — I10 PRIMARY HYPERTENSION: Primary | ICD-10-CM

## 2025-02-24 RX ORDER — AMLODIPINE BESYLATE 10 MG/1
10 TABLET ORAL DAILY
Qty: 30 TABLET | Refills: 11 | Status: SHIPPED | OUTPATIENT
Start: 2025-02-24 | End: 2026-02-24

## 2025-02-24 NOTE — PROGRESS NOTES
Pt in clinic for BP check. BP obtained via MA hypertensive X3 automatic attempts. This nurse made aware. Pt allowed to sit for 20 mins. Manually obtained /102. Pt informed nurse he has only been taking 5mg d/t pharmacy dispense 10mg tabs taking 0.5 tabs daily. Pt confirmed he was afraid to run out if taking whole tab daily. Provider made aware. Sent new order to pharmacy for 10mg tab daily. Instruction to take and BP check in 2 weeks. Pt scheduled for FUV with PCP in 2 weeks 3 days. Will come in for FUV to do BP recheck. Provider made aware.

## 2025-03-13 ENCOUNTER — OFFICE VISIT (OUTPATIENT)
Facility: HOSPITAL | Age: 55
End: 2025-03-13
Payer: COMMERCIAL

## 2025-03-13 VITALS
OXYGEN SATURATION: 99 % | DIASTOLIC BLOOD PRESSURE: 84 MMHG | HEIGHT: 68 IN | HEART RATE: 99 BPM | SYSTOLIC BLOOD PRESSURE: 144 MMHG | TEMPERATURE: 97.8 F | BODY MASS INDEX: 36.68 KG/M2 | WEIGHT: 242 LBS

## 2025-03-13 DIAGNOSIS — I10 PRIMARY HYPERTENSION: Primary | ICD-10-CM

## 2025-03-13 PROCEDURE — 3008F BODY MASS INDEX DOCD: CPT | Performed by: STUDENT IN AN ORGANIZED HEALTH CARE EDUCATION/TRAINING PROGRAM

## 2025-03-13 PROCEDURE — 3062F POS MACROALBUMINURIA REV: CPT | Performed by: STUDENT IN AN ORGANIZED HEALTH CARE EDUCATION/TRAINING PROGRAM

## 2025-03-13 PROCEDURE — 99213 OFFICE O/P EST LOW 20 MIN: CPT | Performed by: STUDENT IN AN ORGANIZED HEALTH CARE EDUCATION/TRAINING PROGRAM

## 2025-03-13 PROCEDURE — 3077F SYST BP >= 140 MM HG: CPT | Performed by: STUDENT IN AN ORGANIZED HEALTH CARE EDUCATION/TRAINING PROGRAM

## 2025-03-13 PROCEDURE — 3079F DIAST BP 80-89 MM HG: CPT | Performed by: STUDENT IN AN ORGANIZED HEALTH CARE EDUCATION/TRAINING PROGRAM

## 2025-03-13 PROCEDURE — 3052F HG A1C>EQUAL 8.0%<EQUAL 9.0%: CPT | Performed by: STUDENT IN AN ORGANIZED HEALTH CARE EDUCATION/TRAINING PROGRAM

## 2025-03-13 PROCEDURE — 4010F ACE/ARB THERAPY RXD/TAKEN: CPT | Performed by: STUDENT IN AN ORGANIZED HEALTH CARE EDUCATION/TRAINING PROGRAM

## 2025-03-13 RX ORDER — SPIRONOLACTONE 25 MG/1
25 TABLET ORAL DAILY
Qty: 30 TABLET | Refills: 5 | Status: SHIPPED | OUTPATIENT
Start: 2025-03-13 | End: 2025-03-13

## 2025-03-13 RX ORDER — SPIRONOLACTONE 25 MG/1
25 TABLET ORAL DAILY
Qty: 90 TABLET | Refills: 3 | Status: SHIPPED | OUTPATIENT
Start: 2025-03-13 | End: 2026-03-08

## 2025-03-13 ASSESSMENT — PAIN SCALES - GENERAL: PAINLEVEL_OUTOF10: 0-NO PAIN

## 2025-03-13 NOTE — PROGRESS NOTES
"  Subjective   Patient ID: Yrn Rock is a 54 y.o. male who presents for Hypertension.     1. HTN  Reliably taking medications  150-160 SBP at home  Taking benicar 40 mg, amlodipine 10 mg daily  Has not been exercising, concern from a friend who had AMI after exercising and being previously sedentary  Weight is up  Working on low-sodium diet, but difficult  Previously was on hydrochlorothiazide and had a precipitated gout attack    A/  Repeat manual /84  BMI 36.8  2+ pitting edema of BLE to the proximal shins  Known     P/  [ ] addition of spironolactone 25 mg (cannot tolerate thiazide, 2/2 gout)  [ ] referral placed to Cardiology             Review of Systems   Respiratory:  Positive for shortness of breath. Negative for apnea and chest tightness.    Cardiovascular:  Positive for leg swelling. Negative for chest pain and palpitations.   All other systems reviewed and are negative.      Objective   /84   Pulse 99   Temp 36.6 °C (97.8 °F) (Temporal)   Ht 1.727 m (5' 8\")   Wt 110 kg (242 lb)   SpO2 99%   BMI 36.80 kg/m²     Physical Exam  Vitals reviewed.   Constitutional:       General: He is not in acute distress.     Appearance: He is obese.   HENT:      Head: Normocephalic and atraumatic.      Nose: Nose normal.      Mouth/Throat:      Mouth: Mucous membranes are moist.      Pharynx: Oropharynx is clear.   Eyes:      Extraocular Movements: Extraocular movements intact.      Conjunctiva/sclera: Conjunctivae normal.      Pupils: Pupils are equal, round, and reactive to light.   Cardiovascular:      Rate and Rhythm: Normal rate.      Pulses: Normal pulses.      Heart sounds: Normal heart sounds. No murmur heard.     No friction rub. No gallop.      Comments: 2+ pitting edema of BLE to the proximal shins  Pulmonary:      Effort: Pulmonary effort is normal.      Breath sounds: Normal breath sounds.   Abdominal:      General: Abdomen is flat. Bowel sounds are normal.      Palpations: Abdomen is " soft.   Musculoskeletal:      Cervical back: Normal range of motion and neck supple.   Skin:     General: Skin is warm and dry.      Capillary Refill: Capillary refill takes less than 2 seconds.   Neurological:      General: No focal deficit present.      Mental Status: He is alert.   Psychiatric:         Mood and Affect: Mood normal.         Behavior: Behavior normal.         Assessment/Plan   Problem List Items Addressed This Visit             ICD-10-CM    Hypertension - Primary I10    Relevant Medications    spironolactone (Aldactone) 25 mg tablet    Other Relevant Orders    Referral to Cardiology          Note: This documentaion was entered into the electronic medical record using [employer] medical dictation software, and was not necessarily edited thereafter. Please excuse any errors of spelling or grammar.

## 2025-03-14 ASSESSMENT — ENCOUNTER SYMPTOMS
CHEST TIGHTNESS: 0
APNEA: 0
PALPITATIONS: 0
SHORTNESS OF BREATH: 1

## 2025-04-04 ENCOUNTER — APPOINTMENT (OUTPATIENT)
Dept: OPHTHALMOLOGY | Facility: CLINIC | Age: 55
End: 2025-04-04
Payer: COMMERCIAL

## 2025-06-20 ENCOUNTER — APPOINTMENT (OUTPATIENT)
Dept: OPHTHALMOLOGY | Facility: CLINIC | Age: 55
End: 2025-06-20
Payer: COMMERCIAL

## 2025-06-20 DIAGNOSIS — E11.3313 MODERATE NONPROLIFERATIVE DIABETIC RETINOPATHY OF BOTH EYES WITH MACULAR EDEMA ASSOCIATED WITH TYPE 2 DIABETES MELLITUS: Primary | ICD-10-CM

## 2025-06-20 PROCEDURE — 92134 CPTRZ OPH DX IMG PST SGM RTA: CPT | Performed by: OPHTHALMOLOGY

## 2025-06-20 PROCEDURE — 67028 INJECTION EYE DRUG: CPT | Mod: BILATERAL PROCEDURE | Performed by: OPHTHALMOLOGY

## 2025-06-20 ASSESSMENT — CONF VISUAL FIELD
METHOD: COUNTING FINGERS
OS_INFERIOR_TEMPORAL_RESTRICTION: 0
OD_NORMAL: 1
OD_INFERIOR_NASAL_RESTRICTION: 0
OS_SUPERIOR_TEMPORAL_RESTRICTION: 0
OS_SUPERIOR_NASAL_RESTRICTION: 0
OS_INFERIOR_NASAL_RESTRICTION: 0
OD_SUPERIOR_NASAL_RESTRICTION: 0
OD_SUPERIOR_TEMPORAL_RESTRICTION: 0
OD_INFERIOR_TEMPORAL_RESTRICTION: 0
OS_NORMAL: 1

## 2025-06-20 ASSESSMENT — VISUAL ACUITY
OS_SC: 20/60
OS_PH_SC+: +2
OD_PH_SC+: +2
OD_SC: 20/40
OS_PH_SC: 20/50
OD_PH_SC: 20/25
METHOD: SNELLEN - LINEAR

## 2025-06-20 ASSESSMENT — EXTERNAL EXAM - LEFT EYE: OS_EXAM: NORMAL

## 2025-06-20 ASSESSMENT — ENCOUNTER SYMPTOMS
GASTROINTESTINAL NEGATIVE: 0
ENDOCRINE NEGATIVE: 0
CONSTITUTIONAL NEGATIVE: 0
MUSCULOSKELETAL NEGATIVE: 0
ALLERGIC/IMMUNOLOGIC NEGATIVE: 0
EYES NEGATIVE: 1
NEUROLOGICAL NEGATIVE: 0
PSYCHIATRIC NEGATIVE: 0
CARDIOVASCULAR NEGATIVE: 0
RESPIRATORY NEGATIVE: 0
HEMATOLOGIC/LYMPHATIC NEGATIVE: 0

## 2025-06-20 ASSESSMENT — TONOMETRY
OD_IOP_MMHG: 10
OS_IOP_MMHG: 08
IOP_METHOD: TONOPEN

## 2025-06-20 ASSESSMENT — EXTERNAL EXAM - RIGHT EYE: OD_EXAM: NORMAL

## 2025-06-20 ASSESSMENT — CUP TO DISC RATIO
OS_RATIO: 0.3
OD_RATIO: 0.3

## 2025-06-20 ASSESSMENT — SLIT LAMP EXAM - LIDS
COMMENTS: NORMAL
COMMENTS: NORMAL

## 2025-06-25 ENCOUNTER — OFFICE VISIT (OUTPATIENT)
Facility: HOSPITAL | Age: 55
End: 2025-06-25
Payer: COMMERCIAL

## 2025-06-25 VITALS
WEIGHT: 256.6 LBS | DIASTOLIC BLOOD PRESSURE: 82 MMHG | OXYGEN SATURATION: 97 % | HEART RATE: 98 BPM | TEMPERATURE: 97.1 F | HEIGHT: 68 IN | BODY MASS INDEX: 38.89 KG/M2 | SYSTOLIC BLOOD PRESSURE: 132 MMHG

## 2025-06-25 DIAGNOSIS — E11.42 TYPE 2 DIABETES MELLITUS WITH DIABETIC POLYNEUROPATHY, WITHOUT LONG-TERM CURRENT USE OF INSULIN: ICD-10-CM

## 2025-06-25 DIAGNOSIS — M77.8 TENDINITIS OF RIGHT SHOULDER: ICD-10-CM

## 2025-06-25 DIAGNOSIS — R60.0 BILATERAL LOWER EXTREMITY EDEMA: Primary | ICD-10-CM

## 2025-06-25 DIAGNOSIS — R60.0 BILATERAL LOWER EXTREMITY EDEMA: ICD-10-CM

## 2025-06-25 PROCEDURE — 4010F ACE/ARB THERAPY RXD/TAKEN: CPT | Performed by: STUDENT IN AN ORGANIZED HEALTH CARE EDUCATION/TRAINING PROGRAM

## 2025-06-25 PROCEDURE — 99214 OFFICE O/P EST MOD 30 MIN: CPT | Performed by: STUDENT IN AN ORGANIZED HEALTH CARE EDUCATION/TRAINING PROGRAM

## 2025-06-25 PROCEDURE — 3008F BODY MASS INDEX DOCD: CPT | Performed by: STUDENT IN AN ORGANIZED HEALTH CARE EDUCATION/TRAINING PROGRAM

## 2025-06-25 PROCEDURE — 3075F SYST BP GE 130 - 139MM HG: CPT | Performed by: STUDENT IN AN ORGANIZED HEALTH CARE EDUCATION/TRAINING PROGRAM

## 2025-06-25 PROCEDURE — 99215 OFFICE O/P EST HI 40 MIN: CPT | Performed by: STUDENT IN AN ORGANIZED HEALTH CARE EDUCATION/TRAINING PROGRAM

## 2025-06-25 PROCEDURE — 3062F POS MACROALBUMINURIA REV: CPT | Performed by: STUDENT IN AN ORGANIZED HEALTH CARE EDUCATION/TRAINING PROGRAM

## 2025-06-25 PROCEDURE — 3079F DIAST BP 80-89 MM HG: CPT | Performed by: STUDENT IN AN ORGANIZED HEALTH CARE EDUCATION/TRAINING PROGRAM

## 2025-06-25 PROCEDURE — 3052F HG A1C>EQUAL 8.0%<EQUAL 9.0%: CPT | Performed by: STUDENT IN AN ORGANIZED HEALTH CARE EDUCATION/TRAINING PROGRAM

## 2025-06-25 RX ORDER — FUROSEMIDE 20 MG/1
20 TABLET ORAL DAILY
Qty: 30 TABLET | Refills: 11 | Status: SHIPPED | OUTPATIENT
Start: 2025-06-25 | End: 2025-06-26

## 2025-06-25 ASSESSMENT — PAIN SCALES - GENERAL: PAINLEVEL_OUTOF10: 0-NO PAIN

## 2025-06-25 NOTE — PROGRESS NOTES
"  Subjective   Patient ID: Yrn Rock is a 54 y.o. male who presents for Follow-up.     1. Flatulence  Gas attack in the past few weeks  Non-painful distention  A/  Abdominal exam unremarkable  Considering potential Metfomrin XR SFX  Does not find it bothersome enough to start Rx    2. DM  Needs repeat labs  Weight gain still an issue  Taking metformin XR 1000 mg BID and jardiance 25 mg daily   Though forgot to refill Jardiance and was out for 2 weeks  A/P/  A1c stagnant 8-->8.8%  [ ] repeat A1c  [ ] discussed the utility of GLP-1 as potential adjunct or else in place of one of his current regimen    3. Lower back and buttock pain  Noted when walked 15 minutes  Water rower machine  Some calisthenics  Tylenol not helping    A/P/  Mild TTP of the bilateral hip joints, IT bands, and gluteus medius mm.  [ ] ref PT    4. HTN, symptoms concerning for HF  Good adherence to benicar, amlodipine, low dose sprio 25  Has been gaining weight slowly, attributed to difficulty in starting exercise routine  Some mild SOB with long distances  Denies CP, LOC, falls, headaches, vision problems    A/  3+ pitting edema to the knees in bilateral LE  Elevated intiial BP reading with manual repeat 132/82  Reviewed last echo 2/22/24 which did not              Review of Systems   All other systems reviewed and are negative.      Objective   /82   Pulse 98   Temp 36.2 °C (97.1 °F) (Temporal)   Ht 1.727 m (5' 8\")   Wt 116 kg (256 lb 9.6 oz)   SpO2 97%   BMI 39.02 kg/m²         Physical Exam  Vitals reviewed.   Constitutional:       General: He is not in acute distress.     Appearance: He is obese.      Comments: BMI 39, class 2 obesity   HENT:      Head: Normocephalic and atraumatic.      Nose: Nose normal.      Mouth/Throat:      Mouth: Mucous membranes are moist.      Pharynx: Oropharynx is clear.   Eyes:      Extraocular Movements: Extraocular movements intact.      Conjunctiva/sclera: Conjunctivae normal.      Pupils: Pupils " are equal, round, and reactive to light.   Cardiovascular:      Rate and Rhythm: Normal rate.      Pulses: Normal pulses.      Heart sounds: Normal heart sounds. No murmur heard.     No friction rub. No gallop.      Comments: 3+ pitting edema to the knees in bilateral LE  Pulmonary:      Effort: Pulmonary effort is normal.      Breath sounds: Normal breath sounds.   Abdominal:      General: Abdomen is flat. Bowel sounds are normal.      Palpations: Abdomen is soft.   Musculoskeletal:      Cervical back: Normal range of motion and neck supple.      Comments: Mild TTP of the bilateral hip joints, IT bands, and gluteus medius mm.   Skin:     General: Skin is warm and dry.      Capillary Refill: Capillary refill takes less than 2 seconds.   Neurological:      General: No focal deficit present.      Mental Status: He is alert.   Psychiatric:         Mood and Affect: Mood normal.         Behavior: Behavior normal.         Assessment/Plan   Problem List Items Addressed This Visit    None  Visit Diagnoses         Codes      Bilateral lower extremity edema    -  Primary R60.0      Type 2 diabetes mellitus with diabetic polyneuropathy, without long-term current use of insulin     E11.42    Relevant Orders    Hemoglobin A1c      Tendinitis of right shoulder     M77.8    Relevant Orders    Referral to Physical Therapy                 Note: This documentaion was entered into the electronic medical record using [employer] medical dictation software, and was not necessarily edited thereafter. Please excuse any errors of spelling or grammar.     caffeine

## 2025-06-26 RX ORDER — FUROSEMIDE 20 MG/1
20 TABLET ORAL DAILY
Qty: 90 TABLET | Refills: 1 | Status: SHIPPED | OUTPATIENT
Start: 2025-06-26

## 2025-07-02 ENCOUNTER — APPOINTMENT (OUTPATIENT)
Dept: CARDIOLOGY | Facility: HOSPITAL | Age: 55
End: 2025-07-02
Payer: COMMERCIAL

## 2025-07-02 VITALS
BODY MASS INDEX: 39.22 KG/M2 | WEIGHT: 258.8 LBS | HEART RATE: 99 BPM | OXYGEN SATURATION: 98 % | SYSTOLIC BLOOD PRESSURE: 167 MMHG | DIASTOLIC BLOOD PRESSURE: 91 MMHG | HEIGHT: 68 IN

## 2025-07-02 DIAGNOSIS — I10 PRIMARY HYPERTENSION: Primary | ICD-10-CM

## 2025-07-02 DIAGNOSIS — I10 PRIMARY HYPERTENSION: ICD-10-CM

## 2025-07-02 DIAGNOSIS — R80.9 PROTEINURIA, UNSPECIFIED TYPE: ICD-10-CM

## 2025-07-02 DIAGNOSIS — R60.0 LOWER EXTREMITY EDEMA: ICD-10-CM

## 2025-07-02 DIAGNOSIS — E78.2 MIXED HYPERLIPIDEMIA: ICD-10-CM

## 2025-07-02 PROCEDURE — 93005 ELECTROCARDIOGRAM TRACING: CPT | Performed by: STUDENT IN AN ORGANIZED HEALTH CARE EDUCATION/TRAINING PROGRAM

## 2025-07-02 PROCEDURE — 3080F DIAST BP >= 90 MM HG: CPT | Performed by: STUDENT IN AN ORGANIZED HEALTH CARE EDUCATION/TRAINING PROGRAM

## 2025-07-02 PROCEDURE — 3052F HG A1C>EQUAL 8.0%<EQUAL 9.0%: CPT | Performed by: STUDENT IN AN ORGANIZED HEALTH CARE EDUCATION/TRAINING PROGRAM

## 2025-07-02 PROCEDURE — 3008F BODY MASS INDEX DOCD: CPT | Performed by: STUDENT IN AN ORGANIZED HEALTH CARE EDUCATION/TRAINING PROGRAM

## 2025-07-02 PROCEDURE — 99212 OFFICE O/P EST SF 10 MIN: CPT

## 2025-07-02 PROCEDURE — 4010F ACE/ARB THERAPY RXD/TAKEN: CPT | Performed by: STUDENT IN AN ORGANIZED HEALTH CARE EDUCATION/TRAINING PROGRAM

## 2025-07-02 PROCEDURE — 3077F SYST BP >= 140 MM HG: CPT | Performed by: STUDENT IN AN ORGANIZED HEALTH CARE EDUCATION/TRAINING PROGRAM

## 2025-07-02 PROCEDURE — 99205 OFFICE O/P NEW HI 60 MIN: CPT | Performed by: STUDENT IN AN ORGANIZED HEALTH CARE EDUCATION/TRAINING PROGRAM

## 2025-07-02 PROCEDURE — 3062F POS MACROALBUMINURIA REV: CPT | Performed by: STUDENT IN AN ORGANIZED HEALTH CARE EDUCATION/TRAINING PROGRAM

## 2025-07-02 RX ORDER — ATORVASTATIN CALCIUM 80 MG/1
80 TABLET, FILM COATED ORAL DAILY
Qty: 90 TABLET | OUTPATIENT
Start: 2025-07-02

## 2025-07-02 RX ORDER — AMLODIPINE BESYLATE 5 MG/1
5 TABLET ORAL DAILY
Qty: 90 TABLET | OUTPATIENT
Start: 2025-07-02

## 2025-07-02 RX ORDER — SPIRONOLACTONE 25 MG/1
25 TABLET ORAL DAILY
Qty: 30 TABLET | Refills: 11 | Status: SHIPPED | OUTPATIENT
Start: 2025-07-02 | End: 2025-07-02

## 2025-07-02 RX ORDER — TELMISARTAN 80 MG/1
80 TABLET ORAL DAILY
Qty: 30 TABLET | Refills: 11 | Status: SHIPPED | OUTPATIENT
Start: 2025-07-02 | End: 2026-07-02

## 2025-07-02 RX ORDER — FUROSEMIDE 40 MG/1
40 TABLET ORAL 2 TIMES DAILY
Qty: 60 TABLET | Refills: 11 | Status: SHIPPED | OUTPATIENT
Start: 2025-07-02 | End: 2026-07-02

## 2025-07-02 RX ORDER — SPIRONOLACTONE 25 MG/1
25 TABLET ORAL DAILY
Qty: 90 TABLET | Refills: 3 | Status: SHIPPED | OUTPATIENT
Start: 2025-07-02 | End: 2026-07-02

## 2025-07-02 RX ORDER — TELMISARTAN 80 MG/1
80 TABLET ORAL DAILY
Qty: 90 TABLET | OUTPATIENT
Start: 2025-07-02

## 2025-07-02 RX ORDER — ATORVASTATIN CALCIUM 80 MG/1
80 TABLET, FILM COATED ORAL DAILY
Qty: 30 TABLET | Refills: 11 | Status: SHIPPED | OUTPATIENT
Start: 2025-07-02 | End: 2026-07-02

## 2025-07-02 RX ORDER — AMLODIPINE BESYLATE 5 MG/1
5 TABLET ORAL DAILY
Qty: 30 TABLET | Refills: 11 | Status: SHIPPED | OUTPATIENT
Start: 2025-07-02 | End: 2026-07-02

## 2025-07-02 ASSESSMENT — PATIENT HEALTH QUESTIONNAIRE - PHQ9
SUM OF ALL RESPONSES TO PHQ9 QUESTIONS 1 AND 2: 0
2. FEELING DOWN, DEPRESSED OR HOPELESS: NOT AT ALL
1. LITTLE INTEREST OR PLEASURE IN DOING THINGS: NOT AT ALL

## 2025-07-02 ASSESSMENT — COLUMBIA-SUICIDE SEVERITY RATING SCALE - C-SSRS
2. HAVE YOU ACTUALLY HAD ANY THOUGHTS OF KILLING YOURSELF?: NO
6. HAVE YOU EVER DONE ANYTHING, STARTED TO DO ANYTHING, OR PREPARED TO DO ANYTHING TO END YOUR LIFE?: NO
1. IN THE PAST MONTH, HAVE YOU WISHED YOU WERE DEAD OR WISHED YOU COULD GO TO SLEEP AND NOT WAKE UP?: NO

## 2025-07-02 ASSESSMENT — ENCOUNTER SYMPTOMS
DEPRESSION: 0
OCCASIONAL FEELINGS OF UNSTEADINESS: 1
LOSS OF SENSATION IN FEET: 1

## 2025-07-02 ASSESSMENT — PAIN SCALES - GENERAL: PAINLEVEL_OUTOF10: 0-NO PAIN

## 2025-07-02 NOTE — PATIENT INSTRUCTIONS
Dear Yrn Rock,    It was a pleasure meeting you today at the Cardiology office. As we dicussed, your clinical condition is lower extremity edema, proteinuria, hypertension and hyperlipidemia. I recommended a transthoracic echocardiogram, a nephrology referral, changing your blood pressure medications and increasing your furosemide. Please follow up with me in the Cardiology office once your results are available so we can discuss them.    Sincerely,     Deonte Espitia MD

## 2025-07-02 NOTE — PROGRESS NOTES
Location of visit: [facility]   Type of Visit: Consult - Dr. Ramos    Chief Complaint:  Patient was referred to Cardiology by Dr. Ramos for New Patient Visit (Swelling of legs).    History Of Present Illness:    Yrn Rock is a 54 y.o. male, with history significant for HTN, T2DM, proteinuria, who visits Cardiology today as a new patient  for lower extremity edema.    Patient denies chest pain, dyspnea on exertion, shortness of breath, orthopnea, PND, nocturia, edema, palpitations, dizziness, lightheadedness, syncope, claudication, or snoring/apnea.    Blood pressure: 166/95 mmHg  HR: 99 bpm    Today's ECG shows sinus rhythm at 95 bpm, LA enlargement, normal AV conduction, incomplete RBBB, possible LVH, and normal ventricular repolarization.    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    Past Surgical History:  He has no past surgical history on file.    Social History:  He reports that he has never smoked. He uses smokeless tobacco. He reports current alcohol use. He reports that he does not use drugs.    Family History:  Family History[1]    Allergies:  Patient has no known allergies.    Outpatient Medications:  Current Outpatient Medications   Medication Instructions    acyclovir (Zovirax) 400 mg tablet 1 tablet, 2 times daily    alcohol swabs pads, medicated 1 Units, topical (top), Daily    amLODIPine (NORVASC) 10 mg, oral, Daily    blood-glucose meter misc Please use to check blood sugar daily    empagliflozin (JARDIANCE) 25 mg, oral, Daily    FreeStyle Venu 3 [place] misc  "Use as instructed    FreeStyle Venu 3 Sensor device Use to check blood sugars 3x daily and with meals    furosemide (LASIX) 20 mg, oral, Daily    lancets misc Please use to check blood sugar    metFORMIN XR (GLUCOPHAGE-XR) 1,000 mg, oral, 2 times daily (morning and late afternoon), Do not crush, chew, or split.    miscellaneous medical supply misc 1 Units, miscellaneous, Daily, Patient requires home blood pressure machine and cuff to measure blood pressure in ambulatory setting    olmesartan (BENICAR) 40 mg, oral, Daily     Last Recorded Vitals:  Vitals:    07/02/25 1035 07/02/25 1036   BP: (!) 166/95 (!) 167/91   BP Location: Left arm Right arm   Patient Position: Sitting Sitting   BP Cuff Size: Large adult Large adult   Pulse: 99    SpO2: 98%    Weight: 117 kg (258 lb 12.8 oz)    Height: 1.727 m (5' 8\")      Physical Exam:      7/2/2025    10:36 AM 7/2/2025    10:35 AM 6/25/2025     3:56 PM 6/25/2025     3:06 PM 3/13/2025     3:20 PM 3/13/2025     3:04 PM   Vitals   Systolic 167 166 132 165 144 168   Diastolic 91 95 82 92 84 90   BP Location Right arm Left arm  Right arm     Heart Rate  99  98     Temp    36.2 °C (97.1 °F)     Height  1.727 m (5' 8\")  1.727 m (5' 8\")     Weight (lb)  258.8  256.6     BMI  39.35 kg/m2  39.02 kg/m2     BSA (m2)  2.37 m2  2.36 m2     Visit Report Report Report Report Report Report Report     Wt Readings from Last 5 Encounters:   07/02/25 117 kg (258 lb 12.8 oz)   06/25/25 116 kg (256 lb 9.6 oz)   03/13/25 110 kg (242 lb)   01/24/25 110 kg (242 lb)   04/15/24 104 kg (229 lb 3.2 oz)     General: Sitting up comfortably in chair; in no apparent distress.  HEENT: Normocephalic; atraumatic. Well hydrated.  Eyes: Anicteric sclera. Extraocular movement intact.  Neck: Supple; no thyromegaly; normal jugular venous pressure, no bruits.  Respiratory: Bilateral air entry equal. No wheezing.  Cardiovascular: Normal S1, S2; no murmurs auscultated.  Abdomen: Nondistended; nontender. (+) bowel " sounds.  Extremities: No peripheral edema present. Pulses 2+ diffusely.  Neurological: Oriented to time, place, and person; nonfocal.  Psychiatric: Normal affect.     Last Labs Reviewed:  CMP -  Recent Labs     01/24/25  1708      K 4.0      CO2 31   ANIONGAP 13   BUN 16   CREATININE 1.13   EGFR 76   CALCIUM 9.9     Recent Labs     01/24/25  1708   ALBUMIN 4.0   ALKPHOS 65   ALT 26   AST 19   BILITOT 0.5     HEME/ENDO -  Recent Labs     01/24/25  1708   HGBA1C 8.8*     Last Cardiology/Imaging Tests Personally Reviewed (if images available) and Interpreted:  ECG:  ECG 12 lead (Clinic Performed) 10/10/2023    Echocardiogram:  Transthoracic Echo (TTE) Complete 03/15/2024  1. Left ventricular systolic function is normal.  2. Spectral Doppler shows an impaired relaxation pattern of left ventricular diastolic filling.  3. There are no prior studies on this patient for comparison purposes.    Cath:  No results found.    Stress Test:  No results found.    Cardiac CT/MRI:  No results found.    CV RISK FACTORS:   # Hypertension: Last BP: (!) 167/91.  # Hyperlipidemia: Last Tchol No results found for requested labs within last 365 days. / LDL No results found for requested labs within last 365 days. / HDL No results found for requested labs within last 365 days. / TRIG No results found for requested labs within last 365 days. (No results in last year.).  # Type II Diabetes Mellitus: Last A1c 8.8 (1/24/2025:  5:08 PM).  # Obesity: Last BMI: 39.36.  # CKD: Last BUN/Cr (GFR): 16/1.13 (76), 1/24/2025:  5:08 PM.    ASCV RISK:  The ASCVD Risk score (Courtney DK, et al., 2019) failed to calculate for the following reasons:    The valid total cholesterol range is 130 to 320 mg/dL    Assessment:  ***  Assessment & Plan  Primary hypertension    Proteinuria, unspecified type    Mixed hyperlipidemia    Lower extremity edema      {Follow up results:57058}  I spent *** minutes assessing the case between pre-charting, face-to-face  patient interaction, and documentation    Deonte Espitia MD         [1]   Family History  Problem Relation Name Age of Onset    No Known Problems Mother      Glaucoma Other Grandmother     Diabetes Other Uncle

## 2025-07-03 LAB
ALBUMIN SERPL-MCNC: 3.7 G/DL (ref 3.6–5.1)
ALBUMIN/CREAT UR: 1999 MG/G CREAT
ALP SERPL-CCNC: 55 U/L (ref 35–144)
ALT SERPL-CCNC: 20 U/L (ref 9–46)
ANION GAP SERPL CALCULATED.4IONS-SCNC: 6 MMOL/L (CALC) (ref 7–17)
AST SERPL-CCNC: 18 U/L (ref 10–35)
ATRIAL RATE: 95 BPM
BILIRUB SERPL-MCNC: 0.5 MG/DL (ref 0.2–1.2)
BUN SERPL-MCNC: 27 MG/DL (ref 7–25)
CALCIUM SERPL-MCNC: 8.8 MG/DL (ref 8.6–10.3)
CHLORIDE SERPL-SCNC: 105 MMOL/L (ref 98–110)
CO2 SERPL-SCNC: 27 MMOL/L (ref 20–32)
CREAT SERPL-MCNC: 1.34 MG/DL (ref 0.7–1.3)
CREAT UR-MCNC: 89 MG/DL (ref 20–320)
EGFRCR SERPLBLD CKD-EPI 2021: 62 ML/MIN/1.73M2
ERYTHROCYTE [DISTWIDTH] IN BLOOD BY AUTOMATED COUNT: 13.7 % (ref 11–15)
EST. AVERAGE GLUCOSE BLD GHB EST-MCNC: 243 MG/DL
EST. AVERAGE GLUCOSE BLD GHB EST-SCNC: 13.5 MMOL/L
GLUCOSE SERPL-MCNC: 254 MG/DL (ref 65–99)
HBA1C MFR BLD: 10.1 %
HCT VFR BLD AUTO: 35.4 % (ref 38.5–50)
HGB BLD-MCNC: 11.8 G/DL (ref 13.2–17.1)
MCH RBC QN AUTO: 29.1 PG (ref 27–33)
MCHC RBC AUTO-ENTMCNC: 33.3 G/DL (ref 32–36)
MCV RBC AUTO: 87.4 FL (ref 80–100)
MICROALBUMIN UR-MCNC: 177.9 MG/DL
P AXIS: 38 DEGREES
P OFFSET: 196 MS
P ONSET: 136 MS
PLATELET # BLD AUTO: 214 THOUSAND/UL (ref 140–400)
PMV BLD REES-ECKER: 11.4 FL (ref 7.5–12.5)
POTASSIUM SERPL-SCNC: 4.2 MMOL/L (ref 3.5–5.3)
PR INTERVAL: 166 MS
PROT SERPL-MCNC: 6.3 G/DL (ref 6.1–8.1)
Q ONSET: 219 MS
QRS COUNT: 16 BEATS
QRS DURATION: 96 MS
QT INTERVAL: 336 MS
QTC CALCULATION(BAZETT): 422 MS
QTC FREDERICIA: 391 MS
R AXIS: -21 DEGREES
RBC # BLD AUTO: 4.05 MILLION/UL (ref 4.2–5.8)
SODIUM SERPL-SCNC: 138 MMOL/L (ref 135–146)
T AXIS: 42 DEGREES
T OFFSET: 387 MS
VENTRICULAR RATE: 95 BPM
WBC # BLD AUTO: 7.9 THOUSAND/UL (ref 3.8–10.8)

## 2025-07-08 PROBLEM — R80.9 PROTEINURIA: Status: ACTIVE | Noted: 2025-07-08

## 2025-07-08 PROBLEM — R60.0 LOWER EXTREMITY EDEMA: Status: ACTIVE | Noted: 2025-07-08

## 2025-07-08 NOTE — ASSESSMENT & PLAN NOTE
- Change ARB to telmisartan in context of proteinuria and diabetes, not expected significant increase in strength  - Continue amlodipine  - May benefit from spironolactone if persists hypertensive

## 2025-07-08 NOTE — ASSESSMENT & PLAN NOTE
- Repeat albuminuria   - Refer to Nephrology with renal vascular US  - Restart Lipitor high intensity

## 2025-07-12 LAB
ATRIAL RATE: 95 BPM
P AXIS: 38 DEGREES
P OFFSET: 196 MS
P ONSET: 136 MS
PR INTERVAL: 166 MS
Q ONSET: 219 MS
QRS COUNT: 16 BEATS
QRS DURATION: 96 MS
QT INTERVAL: 336 MS
QTC CALCULATION(BAZETT): 422 MS
QTC FREDERICIA: 391 MS
R AXIS: -21 DEGREES
T AXIS: 42 DEGREES
T OFFSET: 387 MS
VENTRICULAR RATE: 95 BPM

## 2025-07-18 ENCOUNTER — HOSPITAL ENCOUNTER (OUTPATIENT)
Dept: VASCULAR MEDICINE | Facility: HOSPITAL | Age: 55
Discharge: HOME | End: 2025-07-18
Payer: COMMERCIAL

## 2025-07-18 ENCOUNTER — HOSPITAL ENCOUNTER (OUTPATIENT)
Dept: CARDIOLOGY | Facility: HOSPITAL | Age: 55
Discharge: HOME | End: 2025-07-18
Payer: COMMERCIAL

## 2025-07-18 DIAGNOSIS — R80.9 PROTEINURIA, UNSPECIFIED TYPE: ICD-10-CM

## 2025-07-18 DIAGNOSIS — R60.0 LOWER EXTREMITY EDEMA: ICD-10-CM

## 2025-07-18 DIAGNOSIS — I10 PRIMARY HYPERTENSION: ICD-10-CM

## 2025-07-18 DIAGNOSIS — I10 ESSENTIAL (PRIMARY) HYPERTENSION: ICD-10-CM

## 2025-07-18 PROCEDURE — 93975 VASCULAR STUDY: CPT | Performed by: SURGERY

## 2025-07-18 PROCEDURE — C8929 TTE W OR WO FOL WCON,DOPPLER: HCPCS

## 2025-07-18 PROCEDURE — 93975 VASCULAR STUDY: CPT

## 2025-07-18 PROCEDURE — 2500000004 HC RX 250 GENERAL PHARMACY W/ HCPCS (ALT 636 FOR OP/ED): Mod: JW | Performed by: STUDENT IN AN ORGANIZED HEALTH CARE EDUCATION/TRAINING PROGRAM

## 2025-07-18 PROCEDURE — 93306 TTE W/DOPPLER COMPLETE: CPT

## 2025-07-18 RX ADMIN — HUMAN ALBUMIN MICROSPHERES AND PERFLUTREN 0.5 ML: 10; .22 INJECTION, SOLUTION INTRAVENOUS at 15:45

## 2025-07-19 LAB
AORTIC VALVE MEAN GRADIENT: 5 MMHG
AORTIC VALVE PEAK VELOCITY: 1.64 M/S
AV PEAK GRADIENT: 11 MMHG
AVA (PEAK VEL): 2.34 CM2
AVA (VTI): 2.79 CM2
EJECTION FRACTION APICAL 4 CHAMBER: 70.7
EJECTION FRACTION: 68 %
LEFT ATRIUM VOLUME AREA LENGTH INDEX BSA: 17.7 ML/M2
LEFT VENTRICLE INTERNAL DIMENSION DIASTOLE: 4.7 CM (ref 3.5–6)
LEFT VENTRICULAR OUTFLOW TRACT DIAMETER: 2.1 CM
MITRAL VALVE E/A RATIO: 0.72
RIGHT VENTRICLE FREE WALL PEAK S': 12.7 CM/S
RIGHT VENTRICLE PEAK SYSTOLIC PRESSURE: 25 MMHG
TRICUSPID ANNULAR PLANE SYSTOLIC EXCURSION: 2.2 CM

## 2025-07-23 ENCOUNTER — EVALUATION (OUTPATIENT)
Dept: PHYSICAL THERAPY | Facility: HOSPITAL | Age: 55
End: 2025-07-23
Payer: COMMERCIAL

## 2025-07-23 DIAGNOSIS — M25.819 SHOULDER IMPINGEMENT: ICD-10-CM

## 2025-07-23 DIAGNOSIS — M77.8 RIGHT SHOULDER TENDONITIS: Primary | ICD-10-CM

## 2025-07-23 DIAGNOSIS — M25.619 DECREASED SHOULDER MOBILITY: ICD-10-CM

## 2025-07-23 PROCEDURE — 97110 THERAPEUTIC EXERCISES: CPT | Mod: GP

## 2025-07-23 PROCEDURE — 97161 PT EVAL LOW COMPLEX 20 MIN: CPT | Mod: GP

## 2025-07-23 ASSESSMENT — ENCOUNTER SYMPTOMS
LOSS OF SENSATION IN FEET: 0
OCCASIONAL FEELINGS OF UNSTEADINESS: 0
DEPRESSION: 0

## 2025-07-23 NOTE — PROGRESS NOTES
"      Physical Therapy Initial Evaluation    Patient Name:Yrn Rock  MRN:[id number]  Today's Date:7/23/2025  Referred by: Yrn Ramos  Time Calculation  Start Time: 1515  Stop Time: 1600  Time Calculation (min): 45 min  Evaluation PT Evaluation Time Entry  PT Evaluation (Low) Time Entry: 35  Therapeutic Procedure PT Therapeutic Procedures Time Entry  Therapeutic Exercise Time Entry: 10      Therapy Diagnosis  Problem List Items Addressed This Visit           ICD-10-CM    Right shoulder tendonitis - Primary M77.8    Relevant Orders    Follow Up In Physical Therapy     Other Visit Diagnoses         Codes      Shoulder impingement     M25.819    Relevant Orders    Follow Up In Physical Therapy      Decreased shoulder mobility     M25.619    Relevant Orders    Follow Up In Physical Therapy          Insurance  Visit number: 1   Approved number of visits: 30  Auth required: No  Onset Date: 6/25/2025  Payor: [facility] / Plan: [facility] / Product Type: *No Product type* /     Precautions/Fall Risk:  Fall Risk: None based on professional judgment  Pacemaker: no    Seizures: No    Post Op Movement/Restrictions: No:  Weight Bearing Status: As tolerated  PMH: DM, HTN, neuropathy    SUBJECTIVE  Chief complaint/reason for visit: Pt presents with referral for initial eval for R shoulder tenonitis. Notes that it is actually B shoulders. Also claims to have lower back pain that goes down the back and down the thighs. Notes that with sleeping he will have more R sided pain. Notes the L side. Notes he can feel pulling in his shoulder with reaching up and across his body, and reaching back.   Mechanism of Injury:  unknown  Location of Pain: anterior B shoulders  Current Pain Level (0-10): 0   High Pain Level (0-10): 5   Low Pain Level (0-10): 0  Pain Quality: \"more of a pain than an ache\" and tightness  Pain Exacerbating Factors: raising arm overhead, overhead work  Pain Relieving Factors: nothing " relieves the pain    Medical Screening: Reviewed medical history form with patient and medical screening assessed.   Red Flags: none  Current Medical Management: OTC medication  Prior Level of Function (PLOF)  Patient previously independent with all ADLs  Exercise/Physical Activity: Yes: trying to get back to rowing, walking  Functional limitations: reaching and self care  Work Status: IT  Current Status: remain unchanged  Patient Awareness: Patient is aware of  his diagnosis and prognosis.   Living Environment: house  Social Support: spouse / significant other  Personal Factors That May Impact Care: none    OBJECTIVE  Other Measures  Disability of Arm Shoulder Hand (DASH): 16   Upper Extremity Strength:  MMT 5/5 max  RIGHT LEFT   Shoulder Flexion 4+/5 4+/5   Shoulder Abduction 4+/5* 4+/5*   Shoulder ER 5/5 5/5   Shoulder IR 4/5* 4/5   Elbow Flexion 5/5 5/5   Elbow Extension 5/5 5/5     Upper Extremity ROM: WNL unless documented below:  WFL; reports painful abduction arc from  degrees B   Joint mobility wfl  Posture slouched seated posture, B rounded shoulders and forward head  Palpation no tpp  ER: L shoulder T4 R shoulder T2  IR: L shoulder iliac crest/L1 R shoulder iliac crest    Special tests:  SHOULDER RIGHT LEFT   Hawkin's-Ashkan + +   Neer Impingement  + +   Drop arm - -   Infraspinatus muscle test + -   Lateral chay + +       ASSESSMENT  Patient is a 54 y.o. male who presents with complaint of R>L shoulder pain, decreased strength, painful ROM, poor posturing, and limitations with reaching overhead, out to the side and behind back that is constent with s/s of R shoulder tendonitis and shoulder impingement . Standardized testing and measures administered today reveal that the patient has multiple impairments in body structures and functions, activity limitations, and participation restrictions. These include subjective and objective findings such as pain, decreased ROM, decreased strength, decreased  flexibility, and decreased function. The patient's impairments are likely influenced by mechanical dysfunction, overuse, and pain . Skilled PT services are warranted in order to realize measurable and meaningful change in the above outcome measures and achieve improvements in the patient's functional status and individual goals.    Problem List: Activity limitations, Flexibility, Pain, Posture, Range of motion, Joint mobility, and Strength  Rehab Potential:good  Clinical Presentation: Stable and/or uncomplicated characteristics   Evaluation Complexity: low    PLAN of Care  Goals: Goals set and discussed today.   Patient's Goal for Treatment: Relieve pain and Reduce symptoms  Upper Extremity Goals:  By discharge, patient will:  Demonstrate independence with home exercise program  Tolerate increased exercise without adverse reaction  Demonstrate proper scapular position and posture  Increase ROM of shoulders to WFL pain free abd arc in order to improve the ability to perform essential ADLs  Increase strength of UE to 5/5 pain free in order to improve the ability to perform essential ADLs  Report decrease in pain by >= 2 points to meet MCID  Decrease score of Quick Dash by > 8 points to meet MCID  Be able to sleep through the night without an increase in symptoms  Be able to perform the ADL of reaching, lifting arm overhead, reaching behind back and sleeping without an increase or production of symptoms     Planned interventions include prn:  Therapeutic exercise, Manual therapy, Gait training, Therapeutic activity, Neuromuscular Re Education, and Hot pack/Cold pack  Frequency and duration: 1 time(s) a week, for  4-6 weeks.   Plan of care was developed with input and agreement by the patient.     Plan for next session: Review HEP, UBE warm up, PROM shoulder flexion, abd wall walks pain free range, PROM ABD, postural and scapular strengthening per tolerance in pain free range.     Treatment/Education/Resources Provided  Today: Initial evaluation, instruction regarding home exercise program and patient education regarding diagnosis, prognosis, contributing factors, importance of HEP, role of PT, and postural re-education  Response to Treatment: Improved knowledge and understanding of condition    [employer]:    Access Code: [email or web address]  URL: [email or web address]  Date: 07/23/2025  Prepared by: Roxanne Culp    Exercises  - Seated Scapular Retraction  - 3 x daily - 7 x weekly - 2 sets - 10 reps  - Standing Shoulder Abduction Finger Walk at Wall  - 2 x daily - 7 x weekly - 2 sets - 10 reps  - Standing Sleeper Stretch at Wall  - 2 x daily - 7 x weekly - 5 sets - 10-15 seconds hold  - Standing Shoulder Row with Anchored Resistance  - 1 x daily - 7 x weekly - 3 sets - 10 reps  - Shoulder extension with resistance - Neutral  - 1 x daily - 7 x weekly - 3 sets - 10 reps

## 2025-07-25 ENCOUNTER — OFFICE VISIT (OUTPATIENT)
Facility: HOSPITAL | Age: 55
End: 2025-07-25
Payer: COMMERCIAL

## 2025-07-25 VITALS
SYSTOLIC BLOOD PRESSURE: 157 MMHG | OXYGEN SATURATION: 97 % | TEMPERATURE: 97.9 F | BODY MASS INDEX: 38.45 KG/M2 | HEIGHT: 68 IN | DIASTOLIC BLOOD PRESSURE: 91 MMHG | HEART RATE: 98 BPM | WEIGHT: 253.7 LBS

## 2025-07-25 DIAGNOSIS — E11.42 TYPE 2 DIABETES MELLITUS WITH DIABETIC POLYNEUROPATHY, UNSPECIFIED WHETHER LONG TERM INSULIN USE: ICD-10-CM

## 2025-07-25 PROCEDURE — 3077F SYST BP >= 140 MM HG: CPT | Performed by: STUDENT IN AN ORGANIZED HEALTH CARE EDUCATION/TRAINING PROGRAM

## 2025-07-25 PROCEDURE — 3080F DIAST BP >= 90 MM HG: CPT | Performed by: STUDENT IN AN ORGANIZED HEALTH CARE EDUCATION/TRAINING PROGRAM

## 2025-07-25 PROCEDURE — 4010F ACE/ARB THERAPY RXD/TAKEN: CPT | Performed by: STUDENT IN AN ORGANIZED HEALTH CARE EDUCATION/TRAINING PROGRAM

## 2025-07-25 PROCEDURE — 99213 OFFICE O/P EST LOW 20 MIN: CPT | Performed by: STUDENT IN AN ORGANIZED HEALTH CARE EDUCATION/TRAINING PROGRAM

## 2025-07-25 PROCEDURE — 3008F BODY MASS INDEX DOCD: CPT | Performed by: STUDENT IN AN ORGANIZED HEALTH CARE EDUCATION/TRAINING PROGRAM

## 2025-07-25 RX ORDER — METFORMIN HYDROCHLORIDE 500 MG/1
1000 TABLET, EXTENDED RELEASE ORAL
Qty: 120 TABLET | Refills: 5 | Status: SHIPPED | OUTPATIENT
Start: 2025-07-25 | End: 2026-01-21

## 2025-07-25 RX ORDER — INSULIN GLARGINE-YFGN 100 [IU]/ML
15 INJECTION, SOLUTION SUBCUTANEOUS NIGHTLY
Qty: 10 ML | Refills: 4 | Status: SHIPPED | OUTPATIENT
Start: 2025-07-25 | End: 2026-07-25

## 2025-07-25 RX ORDER — BLOOD-GLUCOSE TRANSMITTER
EACH MISCELLANEOUS
Qty: 1 EACH | Refills: 0 | Status: SHIPPED | OUTPATIENT
Start: 2025-07-25

## 2025-07-25 RX ORDER — BLOOD-GLUCOSE,RECEIVER,CONT
EACH MISCELLANEOUS
Qty: 1 EACH | Refills: 0 | Status: SHIPPED | OUTPATIENT
Start: 2025-07-25

## 2025-07-25 RX ORDER — BLOOD-GLUCOSE SENSOR
EACH MISCELLANEOUS
Qty: 1 EACH | Refills: 0 | Status: SHIPPED | OUTPATIENT
Start: 2025-07-25

## 2025-07-25 RX ORDER — PEN NEEDLE, DIABETIC 30 GX3/16"
1 NEEDLE, DISPOSABLE MISCELLANEOUS DAILY
Qty: 100 EACH | Refills: 3 | Status: SHIPPED | OUTPATIENT
Start: 2025-07-25

## 2025-07-25 ASSESSMENT — PAIN SCALES - GENERAL: PAINLEVEL_OUTOF10: 0-NO PAIN

## 2025-07-25 NOTE — PATIENT INSTRUCTIONS
- Take daily morning fasting glucose. Goal is .  - Take insulin at night time (9pm-11pm).  - You can measure blood sugar prior to a meal. Goal is >70. You can measure  minutes after a meal as well. Goal is <250.  - 10/2/25 you can go to the lab and re-check A1c.

## 2025-07-25 NOTE — PROGRESS NOTES
"  Subjective   Patient ID: Yrn Rock is a 54 y.o. male who presents for Follow-up.     Interval changes:  Following with PT    1. T2DM    Had missed jardiance  Had been only doing metformin once  Gets sleepy after eating most days  Small, snack-like meals    A/P/  10.1% A1c 3 weeks ago  [ ] Lengthy dicsussion of options to add ot current regimen: GLP-1 vs insulin  Given his current retinopathy issues, and what he has heard about the small risk of macular degeneration, he opts for insulin  [ ] Calculated starting dose, will begin with 15U insulin nightly          Review of Systems   All other systems reviewed and are negative.      Objective   BP (!) 157/91   Pulse 98   Temp 36.6 °C (97.9 °F) (Temporal)   Ht 1.727 m (5' 8\")   Wt 115 kg (253 lb 11.2 oz)   SpO2 97%   BMI 38.57 kg/m²     Physical Exam  Vitals reviewed.   Constitutional:       General: He is not in acute distress.     Appearance: He is obese.      Comments: BMI 38   HENT:      Head: Normocephalic and atraumatic.      Nose: Nose normal.      Mouth/Throat:      Mouth: Mucous membranes are moist.      Pharynx: Oropharynx is clear.     Eyes:      Extraocular Movements: Extraocular movements intact.      Conjunctiva/sclera: Conjunctivae normal.      Pupils: Pupils are equal, round, and reactive to light.       Cardiovascular:      Rate and Rhythm: Normal rate.      Pulses: Normal pulses.      Heart sounds: Normal heart sounds. No murmur heard.     No friction rub. No gallop.   Pulmonary:      Effort: Pulmonary effort is normal.      Breath sounds: Normal breath sounds.   Abdominal:      General: Abdomen is flat. Bowel sounds are normal.      Palpations: Abdomen is soft.     Musculoskeletal:      Cervical back: Normal range of motion and neck supple.     Skin:     General: Skin is warm and dry.      Capillary Refill: Capillary refill takes less than 2 seconds.     Neurological:      General: No focal deficit present.      Mental Status: He is alert. " "    Psychiatric:         Mood and Affect: Mood normal.         Behavior: Behavior normal.         Assessment/Plan   Problem List Items Addressed This Visit    None  Visit Diagnoses         Codes      Type 2 diabetes mellitus with diabetic polyneuropathy, unspecified whether long term insulin use     E11.42    Relevant Medications    insulin glargine-yfgn 100 unit/mL vial    metFORMIN XR (Glucophage-XR) 500 mg 24 hr tablet    pen needle, diabetic (Dilcia Pen Needle) 32 gauge x 5/32\" needle    Dexcom G6  misc    Dexcom G6 Sensor device    Dexcom G6 Transmitter device    Other Relevant Orders    Hemoglobin A1C                   Note: This documentaion was entered into the electronic medical record using [employer] medical dictation software, and was not necessarily edited thereafter. Please excuse any errors of spelling or grammar.  "

## 2025-08-07 ENCOUNTER — TREATMENT (OUTPATIENT)
Dept: PHYSICAL THERAPY | Facility: HOSPITAL | Age: 55
End: 2025-08-07
Payer: COMMERCIAL

## 2025-08-07 DIAGNOSIS — M77.8 RIGHT SHOULDER TENDONITIS: ICD-10-CM

## 2025-08-07 DIAGNOSIS — M25.619 DECREASED SHOULDER MOBILITY: ICD-10-CM

## 2025-08-07 DIAGNOSIS — M25.819 SHOULDER IMPINGEMENT: ICD-10-CM

## 2025-08-07 PROCEDURE — 97110 THERAPEUTIC EXERCISES: CPT | Mod: GP

## 2025-08-07 PROCEDURE — 97140 MANUAL THERAPY 1/> REGIONS: CPT | Mod: GP

## 2025-08-07 NOTE — PROGRESS NOTES
Physical Therapy Treatment    Patient Name:Yrn Rock  MRN:[id number]  Today's Date:8/7/2025  Referred by: Yrn Ramos  Time Calculation  Start Time: 1500  Stop Time: 1545  Time Calculation (min): 45 min    Therapy Diagnosis  Problem List Items Addressed This Visit           ICD-10-CM    Right shoulder tendonitis M77.8    Shoulder impingement M25.819    Decreased shoulder mobility M25.619     Insurance  Visit number: 2  Approved number of visits: 30  Auth required: No  Onset Date: 6/25/2025  Payor: [facility] / Plan: [facility] / Product Type: *No Product type* /     Precautions/Fall Risk:  Fall Risk: None based on professional judgment  Pacemaker: no    Seizures: No    Post Op Movement/Restrictions: No:  Weight Bearing Status: As tolerated  PMH: DM, HTN, neuropathy    Subjective/Pain: Pt notes that he has not been doing his HEP as often. Notes he had to focus more on getting his insuline. He reports no better or worse coming into today. He has been trying to limit aggravating activities.   Current Pain Level (0-10): 0    HEP Compliance: Fair    Objective/Outcome Measures:  No objective measures taken today's visit.     Treatment  Therapeutic Procedure PT Therapeutic Procedures Time Entry  Therapeutic Exercise Time Entry: 33  Manual Therapy Time Entry: 12 minutes   UBE 4/4 lvl 1 x 8 min  Pulleys flexion/scaption/abd x2 min ea  Standing sleeper stretch at wall 5x15-20s   3 way posture iso 5x10s ea  Rows 3x10 RED TB  Pull downs 3x10   RED TB    Manual Therapy 12 minutes  R shoulder PROM flexion, abduction and IR/ER oscillations.     Assessment:     Pt presents to today's session with minimal to no complaints of R shoulder pain at start of session. Pt tolerated today's session very well with no reports of increased pain. Pt required intermittent cues for proper performance/posturing with muscle activation. Pt felt slight discomfort in abduction with pulleys, however decreased with  lessening ROM. Pt challenged by postural strengthening noting muscle fatigue at end of today's session. Pt demonstrated improved ROM and decreased muscle guarding with PROM, with no reports in familiar s/s at end of today's session.     Plan for next session:  Continue with R shoulder AROM/PROM pain free range, continue postural strengthening and progress with IYT or supine exercises with chest press, lat pull down, serratus punches as tolerated. Continue MT prn.

## 2025-08-14 ENCOUNTER — APPOINTMENT (OUTPATIENT)
Dept: PHYSICAL THERAPY | Facility: HOSPITAL | Age: 55
End: 2025-08-14
Payer: COMMERCIAL

## 2025-08-14 ENCOUNTER — TREATMENT (OUTPATIENT)
Dept: PHYSICAL THERAPY | Facility: HOSPITAL | Age: 55
End: 2025-08-14
Payer: COMMERCIAL

## 2025-08-14 DIAGNOSIS — M25.819 SHOULDER IMPINGEMENT: ICD-10-CM

## 2025-08-14 DIAGNOSIS — M77.8 RIGHT SHOULDER TENDONITIS: ICD-10-CM

## 2025-08-14 DIAGNOSIS — M25.619 DECREASED SHOULDER MOBILITY: ICD-10-CM

## 2025-08-14 PROCEDURE — 97110 THERAPEUTIC EXERCISES: CPT | Mod: GP

## 2025-08-15 ENCOUNTER — APPOINTMENT (OUTPATIENT)
Dept: OPHTHALMOLOGY | Facility: CLINIC | Age: 55
End: 2025-08-15
Payer: COMMERCIAL

## 2025-08-15 DIAGNOSIS — E11.3552 STABLE PROLIFERATIVE DIABETIC RETINOPATHY OF LEFT EYE ASSOCIATED WITH TYPE 2 DIABETES MELLITUS: ICD-10-CM

## 2025-08-15 DIAGNOSIS — E11.3313 MODERATE NONPROLIFERATIVE DIABETIC RETINOPATHY OF BOTH EYES WITH MACULAR EDEMA ASSOCIATED WITH TYPE 2 DIABETES MELLITUS: Primary | ICD-10-CM

## 2025-08-15 DIAGNOSIS — H35.63 RETINAL HEMORRHAGE OF BOTH EYES: ICD-10-CM

## 2025-08-15 PROCEDURE — 99213 OFFICE O/P EST LOW 20 MIN: CPT | Performed by: OPHTHALMOLOGY

## 2025-08-15 PROCEDURE — 92134 CPTRZ OPH DX IMG PST SGM RTA: CPT | Performed by: OPHTHALMOLOGY

## 2025-08-15 ASSESSMENT — CUP TO DISC RATIO
OD_RATIO: 0.3
OS_RATIO: 0.3

## 2025-08-15 ASSESSMENT — ENCOUNTER SYMPTOMS
CARDIOVASCULAR NEGATIVE: 0
HEMATOLOGIC/LYMPHATIC NEGATIVE: 0
ENDOCRINE NEGATIVE: 0
CONSTITUTIONAL NEGATIVE: 0
RESPIRATORY NEGATIVE: 0
PSYCHIATRIC NEGATIVE: 0
GASTROINTESTINAL NEGATIVE: 0
NEUROLOGICAL NEGATIVE: 0
MUSCULOSKELETAL NEGATIVE: 0
EYES NEGATIVE: 1
ALLERGIC/IMMUNOLOGIC NEGATIVE: 0

## 2025-08-15 ASSESSMENT — EXTERNAL EXAM - RIGHT EYE: OD_EXAM: NORMAL

## 2025-08-15 ASSESSMENT — TONOMETRY
OS_IOP_MMHG: 11
OD_IOP_MMHG: 8
IOP_METHOD: GOLDMANN APPLANATION

## 2025-08-15 ASSESSMENT — SLIT LAMP EXAM - LIDS
COMMENTS: NORMAL
COMMENTS: NORMAL

## 2025-08-15 ASSESSMENT — EXTERNAL EXAM - LEFT EYE: OS_EXAM: NORMAL

## 2025-08-15 ASSESSMENT — VISUAL ACUITY
OS_PH_SC: 20/40
OS_PH_SC+: -1
OS_SC: 20/50
OD_PH_SC: 20/30
METHOD: SNELLEN - LINEAR
OS_SC+: -2
OD_SC+: -1
OD_SC: 20/50

## 2025-08-21 ENCOUNTER — APPOINTMENT (OUTPATIENT)
Dept: PHYSICAL THERAPY | Facility: HOSPITAL | Age: 55
End: 2025-08-21
Payer: COMMERCIAL

## 2025-08-21 ENCOUNTER — TREATMENT (OUTPATIENT)
Dept: PHYSICAL THERAPY | Facility: HOSPITAL | Age: 55
End: 2025-08-21
Payer: COMMERCIAL

## 2025-08-21 DIAGNOSIS — M77.8 RIGHT SHOULDER TENDONITIS: ICD-10-CM

## 2025-08-21 DIAGNOSIS — M25.819 SHOULDER IMPINGEMENT: ICD-10-CM

## 2025-08-21 DIAGNOSIS — M25.619 DECREASED SHOULDER MOBILITY: ICD-10-CM

## 2025-08-21 PROCEDURE — 97110 THERAPEUTIC EXERCISES: CPT | Mod: GP

## 2025-08-21 PROCEDURE — 97140 MANUAL THERAPY 1/> REGIONS: CPT | Mod: GP

## 2025-08-27 ENCOUNTER — OFFICE VISIT (OUTPATIENT)
Dept: CARDIOLOGY | Facility: HOSPITAL | Age: 55
End: 2025-08-27
Payer: COMMERCIAL

## 2025-08-27 VITALS
OXYGEN SATURATION: 98 % | HEART RATE: 100 BPM | BODY MASS INDEX: 36.07 KG/M2 | HEIGHT: 68 IN | SYSTOLIC BLOOD PRESSURE: 166 MMHG | DIASTOLIC BLOOD PRESSURE: 92 MMHG | WEIGHT: 238 LBS

## 2025-08-27 DIAGNOSIS — E78.5 HYPERLIPIDEMIA, UNSPECIFIED HYPERLIPIDEMIA TYPE: ICD-10-CM

## 2025-08-27 DIAGNOSIS — I11.9 HYPERTENSIVE HEART DISEASE WITHOUT HEART FAILURE: ICD-10-CM

## 2025-08-27 DIAGNOSIS — I10 SEVERE HYPERTENSION: ICD-10-CM

## 2025-08-27 DIAGNOSIS — R60.0 LOWER EXTREMITY EDEMA: ICD-10-CM

## 2025-08-27 DIAGNOSIS — R80.9 PROTEINURIA, UNSPECIFIED TYPE: ICD-10-CM

## 2025-08-27 DIAGNOSIS — I10 SEVERE HYPERTENSION: Primary | ICD-10-CM

## 2025-08-27 PROBLEM — R60.9 2+ PITTING EDEMA: Status: RESOLVED | Noted: 2024-05-01 | Resolved: 2025-08-27

## 2025-08-27 PROCEDURE — 3052F HG A1C>EQUAL 8.0%<EQUAL 9.0%: CPT | Performed by: STUDENT IN AN ORGANIZED HEALTH CARE EDUCATION/TRAINING PROGRAM

## 2025-08-27 PROCEDURE — 3079F DIAST BP 80-89 MM HG: CPT | Performed by: STUDENT IN AN ORGANIZED HEALTH CARE EDUCATION/TRAINING PROGRAM

## 2025-08-27 PROCEDURE — 3008F BODY MASS INDEX DOCD: CPT | Performed by: STUDENT IN AN ORGANIZED HEALTH CARE EDUCATION/TRAINING PROGRAM

## 2025-08-27 PROCEDURE — 99215 OFFICE O/P EST HI 40 MIN: CPT | Performed by: STUDENT IN AN ORGANIZED HEALTH CARE EDUCATION/TRAINING PROGRAM

## 2025-08-27 PROCEDURE — 4010F ACE/ARB THERAPY RXD/TAKEN: CPT | Performed by: STUDENT IN AN ORGANIZED HEALTH CARE EDUCATION/TRAINING PROGRAM

## 2025-08-27 PROCEDURE — 3077F SYST BP >= 140 MM HG: CPT | Performed by: STUDENT IN AN ORGANIZED HEALTH CARE EDUCATION/TRAINING PROGRAM

## 2025-08-27 PROCEDURE — 99212 OFFICE O/P EST SF 10 MIN: CPT

## 2025-08-27 PROCEDURE — 3062F POS MACROALBUMINURIA REV: CPT | Performed by: STUDENT IN AN ORGANIZED HEALTH CARE EDUCATION/TRAINING PROGRAM

## 2025-08-27 RX ORDER — FUROSEMIDE 40 MG/1
40 TABLET ORAL DAILY
Qty: 90 TABLET | Refills: 3 | Status: SHIPPED | OUTPATIENT
Start: 2025-08-27 | End: 2025-08-27

## 2025-08-27 RX ORDER — SPIRONOLACTONE 25 MG/1
50 TABLET ORAL DAILY
Qty: 180 TABLET | Refills: 3 | Status: SHIPPED | OUTPATIENT
Start: 2025-08-27 | End: 2025-08-27

## 2025-08-27 RX ORDER — SPIRONOLACTONE 25 MG/1
50 TABLET ORAL DAILY
Qty: 180 TABLET | Refills: 3 | Status: SHIPPED | OUTPATIENT
Start: 2025-08-27 | End: 2026-08-27

## 2025-08-27 RX ORDER — CARVEDILOL 12.5 MG/1
12.5 TABLET ORAL 2 TIMES DAILY
Qty: 180 TABLET | Refills: 3 | Status: SHIPPED | OUTPATIENT
Start: 2025-08-27 | End: 2026-08-27

## 2025-08-27 RX ORDER — FUROSEMIDE 40 MG/1
40 TABLET ORAL DAILY
Qty: 90 TABLET | Refills: 3 | Status: SHIPPED | OUTPATIENT
Start: 2025-08-27 | End: 2026-08-27

## 2025-08-27 RX ORDER — CARVEDILOL 12.5 MG/1
12.5 TABLET ORAL
Qty: 60 TABLET | Refills: 11 | Status: SHIPPED | OUTPATIENT
Start: 2025-08-27 | End: 2025-08-27

## 2025-08-27 ASSESSMENT — PAIN SCALES - GENERAL: PAINLEVEL_OUTOF10: 0-NO PAIN

## 2025-11-21 ENCOUNTER — APPOINTMENT (OUTPATIENT)
Dept: OPHTHALMOLOGY | Facility: CLINIC | Age: 55
End: 2025-11-21
Payer: COMMERCIAL

## 2025-12-08 ENCOUNTER — APPOINTMENT (OUTPATIENT)
Dept: NEPHROLOGY | Facility: CLINIC | Age: 55
End: 2025-12-08
Payer: COMMERCIAL